# Patient Record
Sex: MALE | Race: BLACK OR AFRICAN AMERICAN | NOT HISPANIC OR LATINO | Employment: FULL TIME | URBAN - METROPOLITAN AREA
[De-identification: names, ages, dates, MRNs, and addresses within clinical notes are randomized per-mention and may not be internally consistent; named-entity substitution may affect disease eponyms.]

---

## 2020-03-02 ENCOUNTER — OFFICE VISIT (OUTPATIENT)
Dept: URGENT CARE | Facility: CLINIC | Age: 20
End: 2020-03-02
Payer: COMMERCIAL

## 2020-03-02 VITALS
TEMPERATURE: 99 F | HEART RATE: 88 BPM | WEIGHT: 173 LBS | DIASTOLIC BLOOD PRESSURE: 77 MMHG | SYSTOLIC BLOOD PRESSURE: 135 MMHG | HEIGHT: 66 IN | RESPIRATION RATE: 20 BRPM | BODY MASS INDEX: 27.8 KG/M2

## 2020-03-02 DIAGNOSIS — R68.89 FLU-LIKE SYMPTOMS: Primary | ICD-10-CM

## 2020-03-02 PROCEDURE — 87631 RESP VIRUS 3-5 TARGETS: CPT | Performed by: PHYSICIAN ASSISTANT

## 2020-03-02 PROCEDURE — G0382 LEV 3 HOSP TYPE B ED VISIT: HCPCS | Performed by: PHYSICIAN ASSISTANT

## 2020-03-02 RX ORDER — OSELTAMIVIR PHOSPHATE 75 MG/1
75 CAPSULE ORAL 2 TIMES DAILY
Qty: 10 CAPSULE | Refills: 0 | Status: SHIPPED | OUTPATIENT
Start: 2020-03-02 | End: 2020-03-07

## 2020-03-02 NOTE — PROGRESS NOTES
[facility]        NAME: Lynnette Ta is a 23 y o  male  : 2000    MRN: [id number]  DATE: 2020  TIME: 1:37 PM    Assessment and Plan   Flu-like symptoms [R68 89]  1  Flu-like symptoms  Influenza A/B and RSV by PCR    oseltamivir (TAMIFLU) 75 mg capsule         Patient Instructions     Discussed condition with pt  Presentation is concerning for Influenza  We discussed options and ultimately decided to send out flu swab and gave option to start pt on Tamiflu although treatment window not well defined  and rec hydration, rest, discussed OTC cough/cold meds, fever management, need to quarantine and wear mask if must go out  Follow up with PCP in 3-5 days  Proceed to  ER if symptoms worsen  Chief Complaint     Chief Complaint   Patient presents with    Flu Symptoms     started two days ago, sore throat, h/a, congestion, cough, temp 101, body aches         History of Present Illness       Pt presents with onset two days ago of fever, chills, myalgias, nasal congestion, dry cough, ST  Denies ear pain, N/V/D  Has been taking OTC meds for symptoms  Denies asthma/allergies  He is an active smoker  Review of Systems   Review of Systems   Constitutional: Positive for chills and fever  HENT: Positive for congestion and sore throat  Negative for ear pain  Respiratory: Positive for cough  Cardiovascular: Negative  Gastrointestinal: Negative  Genitourinary: Negative  Musculoskeletal: Positive for myalgias           Current Medications       Current Outpatient Medications:     oseltamivir (TAMIFLU) 75 mg capsule, Take 1 capsule (75 mg total) by mouth 2 (two) times a day for 5 days, Disp: 10 capsule, Rfl: 0    Current Allergies     Allergies as of 2020    (No Known Allergies)            The following portions of the patient's history were reviewed and updated as appropriate: allergies, current medications, past family history, past medical history, past social history, past surgical history and problem list      Past Medical History:   Diagnosis Date    No known health problems        Past Surgical History:   Procedure Laterality Date    NO PAST SURGERIES         History reviewed  No pertinent family history  Medications have been verified  Objective   /77 (Patient Position: Sitting)   Pulse 88   Temp 99 °F (37 2 °C) (Temporal)   Resp 20   Ht 5' 6" (1 676 m)   Wt 78 5 kg (173 lb)   BMI 27 92 kg/m²        Physical Exam     Physical Exam   Constitutional: He is oriented to person, place, and time  He appears well-developed and well-nourished  No distress  HENT:   Right Ear: Hearing, tympanic membrane, external ear and ear canal normal    Left Ear: Hearing, tympanic membrane, external ear and ear canal normal    Nose: Mucosal edema (B/L boggy turbinates) present  Mouth/Throat: Mucous membranes are normal  Posterior oropharyngeal erythema (PND) present  No oropharyngeal exudate  Neck: Neck supple  Cardiovascular: Normal rate, regular rhythm and normal heart sounds  Pulmonary/Chest: Effort normal and breath sounds normal    Lymphadenopathy:     He has no cervical adenopathy  Neurological: He is alert and oriented to person, place, and time  Psychiatric: He has a normal mood and affect  Vitals reviewed

## 2020-03-02 NOTE — PATIENT INSTRUCTIONS
Influenza   WHAT YOU NEED TO KNOW:   Influenza (the flu) is an infection caused by the influenza virus  The flu is easily spread when an infected person coughs, sneezes, or has close contact with others  You may be able to spread the flu to others for 1 week or longer after signs or symptoms appear  DISCHARGE INSTRUCTIONS:   Call 911 for any of the following:   · You have trouble breathing, and your lips look purple or blue  · You have a seizure  Return to the emergency department if:   · You are dizzy, or you are urinating less or not at all  · You have a headache with a stiff neck, and you feel tired or confused  · You have new pain or pressure in your chest     · Your symptoms, such as shortness of breath, vomiting, or diarrhea, get worse  · Your symptoms, such as fever and coughing, seem to get better, but then get worse  Contact your healthcare provider if:   · You have new muscle pain or weakness  · You have questions or concerns about your condition or care  Medicines: You may need any of the following:  · Acetaminophen  decreases pain and fever  It is available without a doctor's order  Ask how much to take and how often to take it  Follow directions  Acetaminophen can cause liver damage if not taken correctly  · NSAIDs , such as ibuprofen, help decrease swelling, pain, and fever  This medicine is available with or without a doctor's order  NSAIDs can cause stomach bleeding or kidney problems in certain people  If you take blood thinner medicine, always ask your healthcare provider if NSAIDs are safe for you  Always read the medicine label and follow directions  · Antivirals  help fight a viral infection  · Take your medicine as directed  Contact your healthcare provider if you think your medicine is not helping or if you have side effects  Tell him or her if you are allergic to any medicine  Keep a list of the medicines, vitamins, and herbs you take   Include the amounts, and when and why you take them  Bring the list or the pill bottles to follow-up visits  Carry your medicine list with you in case of an emergency  Rest  as much as you can to help you recover  Drink liquids as directed  to help prevent dehydration  Ask how much liquid to drink each day and which liquids are best for you  Prevent the spread of influenza:   · Wash your hands often  Use soap and water  Wash your hands after you use the bathroom, change a child's diapers, or sneeze  Wash your hands before you prepare or eat food  Use gel hand cleanser when soap and water are not available  Do not touch your eyes, nose, or mouth unless you have washed your hands first            · Cover your mouth when you sneeze or cough  Cough into a tissue or the bend of your arm  · Clean shared items with a germ-killing   Clean table surfaces, doorknobs, and light switches  Do not share towels, silverware, and dishes with people who are sick  Wash bed sheets, towels, silverware, and dishes with soap and water  · Wear a mask  over your mouth and nose if you are sick or are near anyone who is sick  · Stay away from others  if you are sick  · Influenza vaccine  helps prevent influenza (flu)  Everyone older than 6 months should get a yearly influenza vaccine  Get the vaccine as soon as it is available, usually in September or October each year  Follow up with your healthcare provider as directed:  Write down your questions so you remember to ask them during your visits  © 2017 [address] Information is for End User's use only and may not be sold, redistributed or otherwise used for commercial purposes  All illustrations and images included in CareNotes® are the copyrighted property of [employer]  or Garrick Moreau  The above information is an  only  It is not intended as medical advice for individual conditions or treatments   Talk to your doctor, nurse or pharmacist before following any medical regimen to see if it is safe and effective for you

## 2020-03-03 LAB
FLUAV RNA NPH QL NAA+PROBE: ABNORMAL
FLUBV RNA NPH QL NAA+PROBE: DETECTED
RSV RNA NPH QL NAA+PROBE: ABNORMAL

## 2020-05-30 ENCOUNTER — APPOINTMENT (OUTPATIENT)
Dept: URGENT CARE | Age: 20
End: 2020-05-30

## 2020-06-05 ENCOUNTER — APPOINTMENT (OUTPATIENT)
Dept: URGENT CARE | Age: 20
End: 2020-06-05
Payer: OTHER MISCELLANEOUS

## 2020-06-05 PROCEDURE — 99213 OFFICE O/P EST LOW 20 MIN: CPT | Performed by: PHYSICIAN ASSISTANT

## 2020-06-13 ENCOUNTER — APPOINTMENT (EMERGENCY)
Dept: RADIOLOGY | Facility: HOSPITAL | Age: 20
End: 2020-06-13
Payer: OTHER MISCELLANEOUS

## 2020-06-13 ENCOUNTER — APPOINTMENT (OUTPATIENT)
Dept: URGENT CARE | Age: 20
End: 2020-06-13
Payer: OTHER MISCELLANEOUS

## 2020-06-13 ENCOUNTER — HOSPITAL ENCOUNTER (EMERGENCY)
Facility: HOSPITAL | Age: 20
Discharge: HOME | End: 2020-06-13
Attending: EMERGENCY MEDICINE | Admitting: EMERGENCY MEDICINE
Payer: OTHER MISCELLANEOUS

## 2020-06-13 VITALS
HEART RATE: 104 BPM | WEIGHT: 174.16 LBS | TEMPERATURE: 98.6 F | DIASTOLIC BLOOD PRESSURE: 67 MMHG | OXYGEN SATURATION: 97 % | RESPIRATION RATE: 16 BRPM | BODY MASS INDEX: 28.11 KG/M2 | SYSTOLIC BLOOD PRESSURE: 137 MMHG

## 2020-06-13 DIAGNOSIS — S67.10XA CRUSHING INJURY OF FINGER OF RIGHT HAND: Primary | ICD-10-CM

## 2020-06-13 PROCEDURE — 99213 OFFICE O/P EST LOW 20 MIN: CPT | Performed by: PREVENTIVE MEDICINE

## 2020-06-13 PROCEDURE — 73140 X-RAY EXAM OF FINGER(S): CPT

## 2020-06-13 PROCEDURE — 99282 EMERGENCY DEPT VISIT SF MDM: CPT | Performed by: EMERGENCY MEDICINE

## 2020-06-13 PROCEDURE — 99283 EMERGENCY DEPT VISIT LOW MDM: CPT

## 2020-06-13 RX ORDER — IBUPROFEN 400 MG/1
400 TABLET ORAL EVERY 6 HOURS PRN
Qty: 30 TABLET | Refills: 0 | Status: SHIPPED | OUTPATIENT
Start: 2020-06-13 | End: 2021-08-10

## 2020-06-13 RX ORDER — ACETAMINOPHEN 500 MG
1000 TABLET ORAL EVERY 6 HOURS PRN
Qty: 30 TABLET | Refills: 0 | Status: SHIPPED | OUTPATIENT
Start: 2020-06-13 | End: 2021-08-10

## 2020-06-13 RX ORDER — ACETAMINOPHEN 325 MG/1
975 TABLET ORAL ONCE
Status: COMPLETED | OUTPATIENT
Start: 2020-06-13 | End: 2020-06-13

## 2020-06-13 RX ADMIN — ACETAMINOPHEN 975 MG: 325 TABLET ORAL at 02:46

## 2020-06-16 ENCOUNTER — APPOINTMENT (OUTPATIENT)
Dept: URGENT CARE | Age: 20
End: 2020-06-16
Payer: OTHER MISCELLANEOUS

## 2020-06-16 PROCEDURE — 99213 OFFICE O/P EST LOW 20 MIN: CPT | Performed by: NURSE PRACTITIONER

## 2020-07-04 ENCOUNTER — NURSE TRIAGE (OUTPATIENT)
Dept: OTHER | Facility: OTHER | Age: 20
End: 2020-07-04

## 2020-07-04 DIAGNOSIS — Z20.828 SARS-ASSOCIATED CORONAVIRUS EXPOSURE: Primary | ICD-10-CM

## 2020-07-04 NOTE — TELEPHONE ENCOUNTER
Regarding: Coronavirus - testing request   ----- Message from Claudeen Danes sent at 7/4/2020  8:47 AM EDT -----  Patient would like to be COVID tested   Please call patient back

## 2020-07-04 NOTE — TELEPHONE ENCOUNTER
Reason for Disposition   [1] COVID-19 infection suspected by caller or triager AND [2] mild symptoms (cough, fever, or others) AND [3] no complications or SOB    Answer Assessment - Initial Assessment Questions  1  COVID-19 DIAGNOSIS: "Who made your Coronavirus (COVID-19) diagnosis?" "Was it confirmed by a positive lab test?" If not diagnosed by a HCP, ask "Are there lots of cases (community spread) where you live?" (See public health department website, if unsure)      Community  2  ONSET: "When did the COVID-19 symptoms start?"       Thursday  3  WORST SYMPTOM: "What is your worst symptom?" (e g , cough, fever, shortness of breath, muscle aches)      Denies symptoms   4  COUGH: "Do you have a cough?" If so, ask: "How bad is the cough?"        Denies  5  FEVER: "Do you have a fever?" If so, ask: "What is your temperature, how was it measured, and when did it start?"      98 5 (oral) today, 101 2 (temporal) at work on Thursday  6  RESPIRATORY STATUS: "Describe your breathing?" (e g , shortness of breath, wheezing, unable to speak)       Unremarkable  7  BETTER-SAME-WORSE: "Are you getting better, staying the same or getting worse compared to yesterday?"  If getting worse, ask, "In what way?"      Better  8  HIGH RISK DISEASE: "Do you have any chronic medical problems?" (e g , asthma, heart or lung disease, weak immune system, etc )     Poss celiac disease  9  PREGNANCY: "Is there any chance you are pregnant?" "When was your last menstrual period?"      N/A  10   OTHER SYMPTOMS: "Do you have any other symptoms?"  (e g , chills, fatigue, headache, loss of smell or taste, muscle pain, sore throat)        Poss loss of smell    Protocols used: CORONAVIRUS (COVID-19) DIAGNOSED OR SUSPECTED-ADULT-AH

## 2020-07-05 ENCOUNTER — APPOINTMENT (OUTPATIENT)
Dept: URGENT CARE | Age: 20
End: 2020-07-05
Payer: COMMERCIAL

## 2020-07-05 DIAGNOSIS — Z20.828 SARS-ASSOCIATED CORONAVIRUS EXPOSURE: ICD-10-CM

## 2020-07-05 PROCEDURE — U0003 INFECTIOUS AGENT DETECTION BY NUCLEIC ACID (DNA OR RNA); SEVERE ACUTE RESPIRATORY SYNDROME CORONAVIRUS 2 (SARS-COV-2) (CORONAVIRUS DISEASE [COVID-19]), AMPLIFIED PROBE TECHNIQUE, MAKING USE OF HIGH THROUGHPUT TECHNOLOGIES AS DESCRIBED BY CMS-2020-01-R: HCPCS

## 2020-07-14 LAB — SARS-COV-2 RNA SPEC QL NAA+PROBE: NOT DETECTED

## 2020-07-15 ENCOUNTER — TELEPHONE (OUTPATIENT)
Dept: OTHER | Facility: OTHER | Age: 20
End: 2020-07-15

## 2020-07-15 NOTE — TELEPHONE ENCOUNTER
Your test for COVID-19, also known as novel coronavirus, came back negative  You do not have COVID-19  If you have any additional questions, we can schedule a virtual visit for you with a provider or call the [facility] [phone] Option 7 for care advice  For additional information , please visit the Coronavirus FAQ on the [address] ([employer])  Advised that he can print out his test results from My Chart  He said he is having difficulty getting it ti print  He was provided the number for My Chart support

## 2020-08-22 ENCOUNTER — OFFICE VISIT (OUTPATIENT)
Dept: URGENT CARE | Age: 20
End: 2020-08-22
Payer: COMMERCIAL

## 2020-08-22 ENCOUNTER — APPOINTMENT (OUTPATIENT)
Dept: RADIOLOGY | Age: 20
End: 2020-08-22
Payer: COMMERCIAL

## 2020-08-22 VITALS
HEART RATE: 90 BPM | BODY MASS INDEX: 28.12 KG/M2 | RESPIRATION RATE: 18 BRPM | OXYGEN SATURATION: 99 % | WEIGHT: 175 LBS | TEMPERATURE: 96.1 F | HEIGHT: 66 IN

## 2020-08-22 DIAGNOSIS — R06.02 SHORTNESS OF BREATH: Primary | ICD-10-CM

## 2020-08-22 DIAGNOSIS — R06.02 SHORTNESS OF BREATH: ICD-10-CM

## 2020-08-22 PROCEDURE — U0003 INFECTIOUS AGENT DETECTION BY NUCLEIC ACID (DNA OR RNA); SEVERE ACUTE RESPIRATORY SYNDROME CORONAVIRUS 2 (SARS-COV-2) (CORONAVIRUS DISEASE [COVID-19]), AMPLIFIED PROBE TECHNIQUE, MAKING USE OF HIGH THROUGHPUT TECHNOLOGIES AS DESCRIBED BY CMS-2020-01-R: HCPCS | Performed by: PREVENTIVE MEDICINE

## 2020-08-22 PROCEDURE — 71046 X-RAY EXAM CHEST 2 VIEWS: CPT

## 2020-08-22 PROCEDURE — G0382 LEV 3 HOSP TYPE B ED VISIT: HCPCS | Performed by: PREVENTIVE MEDICINE

## 2020-08-22 RX ORDER — AZITHROMYCIN 250 MG/1
TABLET, FILM COATED ORAL
Qty: 6 TABLET | Refills: 0 | Status: SHIPPED | OUTPATIENT
Start: 2020-08-22 | End: 2020-08-26

## 2020-08-22 RX ORDER — ALBUTEROL SULFATE 90 UG/1
2 AEROSOL, METERED RESPIRATORY (INHALATION) EVERY 6 HOURS PRN
Qty: 18 G | Refills: 0 | Status: SHIPPED | OUTPATIENT
Start: 2020-08-22 | End: 2021-08-10

## 2020-08-22 RX ORDER — METHYLPREDNISOLONE 4 MG/1
TABLET ORAL
Qty: 21 EACH | Refills: 0 | Status: SHIPPED | OUTPATIENT
Start: 2020-08-22 | End: 2021-08-10

## 2020-08-22 NOTE — LETTER
August 22, 2020     Patient: Natalia Alexander   YOB: 2000   Date of Visit: 8/22/2020       To Whom It May Concern: It is my medical opinion that [employer] should remain out of work until lab test result returns in 3-5 days       If you have any questions or concerns, please don't hesitate to call           Sincerely,        Alex Pillai MD    CC: No Recipients

## 2020-08-22 NOTE — PATIENT INSTRUCTIONS
Novel Coronavirus   AMBULATORY CARE:   The novel coronavirus (nCoV)  is a new strain (type) of coronavirus  Coronaviruses often cause mild respiratory (lung) infections, such as the common cold  They can also cause more severe infections  Examples include acute respiratory syndrome (SARS), Middle East respiratory syndrome (MERS), pneumonia, and bronchitis  The nCoV can cause more severe symptoms than earlier coronaviruses  The nCoV was first found in individuals in part of [place] at the end of 2019  The virus is spreading as people who are infected travel to other parts of the world  Signs and symptoms of nCoV  can take 2 to 14 days to develop and range from mild to severe:  · Fever    · Cough    · Shortness of breath or trouble breathing    · Pneumonia (in severe cases)    Call your local emergency number (911 in the [address] [place] [address],[address]) for any of the following:  Tell the  you may have nCoV  This will help anyone in the ambulance stay safe, and to call ahead to the hospital   · You have sudden shortness of breath  · You have a fast heartbeat or chest pain  Seek care immediately if:   · You feel so dizzy that you have trouble standing up  · Your lips and fingernails turn blue  Call your doctor if:   · You have a fever over 102ºF (39ºC)  · Your sore throat gets worse or you see white or yellow spots in your throat  · Your symptoms get worse after 3 to 5 days or your cold is not better in 14 days  · You have a rash anywhere on your skin  · You have large, tender lumps in your neck  · You have thick, green, or yellow drainage from your nose  · You cough up thick yellow, green, or bloody mucus  · You are vomiting for more than 24 hours and cannot keep fluids down  · You have a bad earache  · You have questions or concerns about your condition or care  How nCoV spreads: It is not known for sure how the virus spreads   The virus may spread in droplets when an infected person coughs or sneezes  Others become infected by breathing in the virus or getting the virus in their eyes  The virus can also possibly spread if a person touches certain animals, such as in a live market  If you develop symptoms of nCoV,  you may need to be checked  Call your doctor if you traveled within the past 14 days to an area where nCoV is active  Call your doctor if you have close contact with someone else who did  Your doctor will tell you what to do  He or she will need to take precautions in the office to protect staff members and other patients  Your doctor will take samples from your airway  The samples have to be sent to the Centers for Disease Control and Prevention (CDC) to be tested  What to do if you have nCoV:  No treatment is available for nCoV  Medicine may be given to help relieve your cough or fever, or to help you breathe more easily  Severe nCoV may need to be treated in the hospital  In the hospital, you may need breathing treatment or support, such as extra oxygen  The following can help you prevent spreading nCoV to others  Have anyone you are caring for who has nCoV also use the guidelines  · Limit close contact with others  Stay in a different room, or sleep in a separate bed  Remind others to stay at least 6 feet (2 meters) away from you while you are sick  Only go out of your house if you are going to a medical appointment  While you are recovering, always call the office of any healthcare provider you seeing  The provider will need to prepare for your arrival to keep others safe  · Wear a medical mask when you are around others  A medical mask will help prevent you from spreading the virus  You can ask others around you to wear a medical mask if you are not able to wear one  · Cover your mouth and nose to sneeze or cough  Sneeze and cough into a tissue that covers your mouth and nose  Use the bend of our arm if you do not have a tissue  Throw the tissue away in a trash can right away  Then wash your hands well with soap and water  Use hand  that contains alcohol if you cannot wash your hands  · Wash your hands often  You and everyone in your home must wash your hands throughout the day  Use soap and water  Use germ-killing gel if soap and water are not available  Do not touch your eyes or mouth if you have not washed your hands  · Do not share items with other people  Do not share dishes, towels, or other items with anyone  Always wash items after you use them  · Clean frequently touched surfaces often  Use household  or bleach diluted with water to clean counters, doorknobs, toilet seats, and other surfaces  · Do not handle live animals  You may be able to spread nCoV to animals, including pets  Until more is known, it is best not to touch, play with, or handle live animals while you are sick  Help relieve mild symptoms:   · Drink more liquids as directed  Liquids will help thin and loosen mucus so you can cough it up  Liquids will also help prevent dehydration  Liquids that help prevent dehydration include water, fruit juice, and broth  Do not drink liquids that contain caffeine  Caffeine can increase your risk for dehydration  Ask your healthcare provider how much liquid to drink each day  · Soothe a sore throat  Gargle with warm salt water  This helps your sore throat feel better  Make salt water by dissolving ¼ teaspoon salt in 1 cup warm water  You may also suck on hard candy or throat lozenges  You may use a sore throat spray  · Use a humidifier or vaporizer  Use a cool mist humidifier or a vaporizer to increase air moisture in your home  This may make it easier for you to breathe and help decrease your cough  · Use saline nasal drops as directed  These help relieve congestion  · Apply petroleum-based jelly around the outside of your nostrils  This can decrease irritation from blowing your nose  · Do not smoke    Nicotine and other chemicals in cigarettes and cigars can make your symptoms worse  They can also cause infections such as bronchitis or pneumonia  Ask your healthcare provider for information if you currently smoke and need help to quit  E-cigarettes or smokeless tobacco still contain nicotine  Talk to your healthcare provider before you use these products  Follow up with your doctor as directed:  Write down your questions so you remember to ask them during your visits  © Copyright [address] Information is for End User's use only and may not be sold, redistributed or otherwise used for commercial purposes  All illustrations and images included in CareNotes® are the copyrighted property of A D A M , [employer]  or [address] [place] [address]  The above information is an  only  It is not intended as medical advice for individual conditions or treatments  Talk to your doctor, nurse or pharmacist before following any medical regimen to see if it is safe and effective for you

## 2020-08-23 LAB — SARS-COV-2 RNA SPEC QL NAA+PROBE: NOT DETECTED

## 2020-08-24 ENCOUNTER — TELEPHONE (OUTPATIENT)
Dept: URGENT CARE | Age: 20
End: 2020-08-24

## 2020-08-24 NOTE — PROGRESS NOTES
[facility]        NAME: Trudi Velazquez is a 21 y o  male  : 2000    MRN: [id number]  DATE: 2020  TIME: 5:24 PM    Assessment and Plan   Shortness of breath [R06 02]  1  Shortness of breath  Novel Coronavirus (COVID-19), PCR [facility] - Office Collection    XR chest pa & lateral    azithromycin (ZITHROMAX) 250 mg tablet    methylPREDNISolone 4 MG tablet therapy pack    albuterol (Ventolin HFA) 90 mcg/act inhaler    CANCELED: Novel Coronavirus (COVID-19), PCR [facility] - Office Collection         Patient Instructions       Follow up with PCP in 3-5 days  Proceed to  ER if symptoms worsen  Chief Complaint     Chief Complaint   Patient presents with    Shortness of Breath     body aches,fatigue, cough x 2 days          History of Present Illness       Shortness of Breath   This is a new problem  The current episode started in the past 7 days  The problem occurs constantly  The problem has been unchanged  Associated symptoms include a fever and headaches  Nothing aggravates the symptoms  Associated symptoms comments: Shortness of breath, fatigue  The patient has no known risk factors for DVT/PE  He has tried nothing for the symptoms  The treatment provided no relief  Review of Systems   Review of Systems   Constitutional: Positive for fever  Eyes: Negative  Respiratory: Positive for shortness of breath  Cardiovascular: Negative  Neurological: Positive for headaches  All other systems reviewed and are negative          Current Medications       Current Outpatient Medications:     acetaminophen (TYLENOL) 500 mg tablet, Take 2 tablets (1,000 mg total) by mouth every 6 (six) hours as needed for mild pain (Patient not taking: Reported on 2020), Disp: 30 tablet, Rfl: 0    albuterol (Ventolin HFA) 90 mcg/act inhaler, Inhale 2 puffs every 6 (six) hours as needed for wheezing, Disp: 18 g, Rfl: 0    azithromycin (ZITHROMAX) 250 mg tablet, Take 2 tablets today then 1 tablet daily x 4 days, Disp: 6 tablet, Rfl: 0    ibuprofen (MOTRIN) 400 mg tablet, Take 1 tablet (400 mg total) by mouth every 6 (six) hours as needed for mild pain (Patient not taking: Reported on 8/22/2020), Disp: 30 tablet, Rfl: 0    methylPREDNISolone 4 MG tablet therapy pack, Use as directed on package, Disp: 21 each, Rfl: 0    Current Allergies     Allergies as of 08/22/2020    (No Known Allergies)            The following portions of the patient's history were reviewed and updated as appropriate: allergies, current medications, past family history, past medical history, past social history, past surgical history and problem list      Past Medical History:   Diagnosis Date    No known health problems        Past Surgical History:   Procedure Laterality Date    NO PAST SURGERIES         History reviewed  No pertinent family history  Medications have been verified  Objective   Pulse 90   Temp (!) 96 1 °F (35 6 °C)   Resp 18   Ht 5' 6" (1 676 m)   Wt 79 4 kg (175 lb)   SpO2 99%   BMI 28 25 kg/m²        Physical Exam     Physical Exam  Vitals signs and nursing note reviewed  Constitutional:       Appearance: He is well-developed  HENT:      Head: Normocephalic  Right Ear: External ear normal       Left Ear: External ear normal       Nose: Mucosal edema and rhinorrhea present  Mouth/Throat:      Pharynx: Posterior oropharyngeal erythema present  No oropharyngeal exudate  Neck:      Musculoskeletal: Normal range of motion  Cardiovascular:      Rate and Rhythm: Normal rate and regular rhythm  Heart sounds: Normal heart sounds  Pulmonary:      Effort: Pulmonary effort is normal       Breath sounds: No wheezing  Lymphadenopathy:      Cervical: No cervical adenopathy  Skin:     General: Skin is warm  Coloration: Skin is not pale  Findings: No rash

## 2020-09-18 ENCOUNTER — NURSE TRIAGE (OUTPATIENT)
Dept: OTHER | Facility: OTHER | Age: 20
End: 2020-09-18

## 2020-09-18 DIAGNOSIS — Z20.822 COVID-19 RULED OUT: Primary | ICD-10-CM

## 2020-09-18 NOTE — TELEPHONE ENCOUNTER
Regarding: Covid test  ----- Message from Anam Woodard sent at 9/18/2020  7:47 PM EDT -----  "My job is requiring a covid test since I had contact with someone who tested positive "

## 2020-09-18 NOTE — TELEPHONE ENCOUNTER
Patient states "my work is requiring my to get tested again due to contact with a positive covid person"    Reason for Disposition   [1] COVID-19 EXPOSURE (Close Contact) within last 14 days AND [2] NO cough, fever, or breathing difficulty    Answer Assessment - Initial Assessment Questions  1  CLOSE CONTACT: "Who is the person with the confirmed or suspected COVID-19 infection that you were exposed to?"      My friend's family member has it and now my friend is self isolating  I may have had contact with the person at a party  2  PLACE of CONTACT: "Where were you when you were exposed to COVID-19?" (e g , home, school, medical waiting room; which city?)      Family party less then a week ago  3  TYPE of CONTACT: "How much contact was there?" (e g , sitting next to, live in same house, work in same office, same building)      Sitting next to the positive tested person and at the same party for a few hours  4  DURATION of CONTACT: "How long were you in contact with the COVID-19 patient?" (e g , a few seconds, passed by person, a few minutes, live with the patient)     About half a day  5  DATE of CONTACT: "When did you have contact with a COVID-19 patient?" (e g , how many days ago)      2 days ago  6  TRAVEL: "Have you traveled out of the country recently?" If so, "When and where?"      * Also ask about out-of-state travel, since the CDC has identified some high risk cities for community spread in the [address] [place] [address],[address]  * Note: Travel becomes less relevant if there is widespread community transmission where the patient lives  no  7  COMMUNITY SPREAD: "Are there lots of cases of COVID-19 (community spread) where you live?" (See public health department website, if unsure)    * MAJOR community spread: high number of cases; numbers of cases are increasing; many people hospitalized  * MINOR community spread: low number of cases; not increasing; few or no people hospitalized      Unsure,  8   SYMPTOMS: "Do you have any symptoms?" (e g , fever, cough, breathing difficulty)      Pt states "trouble breathing for the past few weeks and I got tested and it was negative" I was given an inhaler at that time  Difficulty breathing has not gotten worse    10  HIGH RISK: "Do you have any heart or lung problems?  Do you have a weak immune system?" (e g , CHF, COPD, asthma, HIV positive, chemotherapy, renal failure, diabetes mellitus, sickle cell anemia)       asthma    Protocols used: CORONAVIRUS (COVID-19) - EXPOSURE-ADULT-AH

## 2020-09-19 ENCOUNTER — APPOINTMENT (OUTPATIENT)
Dept: URGENT CARE | Age: 20
End: 2020-09-19

## 2020-09-19 DIAGNOSIS — Z20.822 COVID-19 RULED OUT: ICD-10-CM

## 2020-09-19 PROCEDURE — U0003 INFECTIOUS AGENT DETECTION BY NUCLEIC ACID (DNA OR RNA); SEVERE ACUTE RESPIRATORY SYNDROME CORONAVIRUS 2 (SARS-COV-2) (CORONAVIRUS DISEASE [COVID-19]), AMPLIFIED PROBE TECHNIQUE, MAKING USE OF HIGH THROUGHPUT TECHNOLOGIES AS DESCRIBED BY CMS-2020-01-R: HCPCS | Performed by: FAMILY MEDICINE

## 2020-09-20 LAB — SARS-COV-2 RNA SPEC QL NAA+PROBE: NOT DETECTED

## 2020-10-14 ENCOUNTER — APPOINTMENT (OUTPATIENT)
Dept: RADIOLOGY | Age: 20
End: 2020-10-14
Payer: COMMERCIAL

## 2020-10-14 ENCOUNTER — OFFICE VISIT (OUTPATIENT)
Dept: URGENT CARE | Age: 20
End: 2020-10-14
Payer: COMMERCIAL

## 2020-10-14 VITALS
SYSTOLIC BLOOD PRESSURE: 118 MMHG | TEMPERATURE: 97 F | RESPIRATION RATE: 18 BRPM | DIASTOLIC BLOOD PRESSURE: 80 MMHG | OXYGEN SATURATION: 99 % | HEART RATE: 92 BPM

## 2020-10-14 DIAGNOSIS — Z20.822 COVID-19 RULED OUT: Primary | ICD-10-CM

## 2020-10-14 DIAGNOSIS — R10.9 ABDOMINAL PAIN, UNSPECIFIED ABDOMINAL LOCATION: ICD-10-CM

## 2020-10-14 PROCEDURE — 74022 RADEX COMPL AQT ABD SERIES: CPT

## 2020-10-14 PROCEDURE — G0382 LEV 3 HOSP TYPE B ED VISIT: HCPCS | Performed by: NURSE PRACTITIONER

## 2020-10-14 PROCEDURE — U0003 INFECTIOUS AGENT DETECTION BY NUCLEIC ACID (DNA OR RNA); SEVERE ACUTE RESPIRATORY SYNDROME CORONAVIRUS 2 (SARS-COV-2) (CORONAVIRUS DISEASE [COVID-19]), AMPLIFIED PROBE TECHNIQUE, MAKING USE OF HIGH THROUGHPUT TECHNOLOGIES AS DESCRIBED BY CMS-2020-01-R: HCPCS | Performed by: NURSE PRACTITIONER

## 2020-10-15 LAB — SARS-COV-2 RNA SPEC QL NAA+PROBE: NOT DETECTED

## 2021-01-04 ENCOUNTER — NURSE TRIAGE (OUTPATIENT)
Dept: OTHER | Facility: OTHER | Age: 21
End: 2021-01-04

## 2021-01-04 DIAGNOSIS — Z20.822 EXPOSURE TO COVID-19 VIRUS: Primary | ICD-10-CM

## 2021-01-04 NOTE — TELEPHONE ENCOUNTER
[address]- Headache and fever 101   2   ----- Message from Randy Thomason sent at 2021  3:19 PM EST -----  "Someone in my house hold tested positive   Headache, fever 101 2 , body ache and soreness

## 2021-01-04 NOTE — TELEPHONE ENCOUNTER
Reason for Disposition   [1] COVID-19 infection suspected by caller or triager AND [2] mild symptoms (cough, fever, or others) AND [2] no complications or SOB    Answer Assessment - Initial Assessment Questions  1  COVID-19 DIAGNOSIS: "Who made your Coronavirus (COVID-19) diagnosis?" "Was it confirmed by a positive lab test?" If not diagnosed by a HCP, ask "Are there lots of cases (community spread) where you live?" (See public health department website, if unsure)      Family member tested positive  2  COVID-19 EXPOSURE: "Was there any known exposure to COVID before the symptoms began?" CDC Definition of close contact: within 6 feet (2 meters) for a total of 15 minutes or more over a 24-hour period  Live in same home  3  ONSET: "When did the COVID-19 symptoms start?"       12/30  4  WORST SYMPTOM: "What is your worst symptom?" (e g , cough, fever, shortness of breath, muscle aches)   body aches  5  COUGH: "Do you have a cough?" If so, ask: "How bad is the cough?"        Wet cough   6  FEVER: "Do you have a fever?" If so, ask: "What is your temperature, how was it measured, and when did it start?"     101 2 - 99  7  RESPIRATORY STATUS: "Describe your breathing?" (e g , shortness of breath, wheezing, unable to speak)     No but when gets coughing attack can get short on air  8  BETTER-SAME-WORSE: "Are you getting better, staying the same or getting worse compared to yesterday?"  If getting worse, ask, "In what way?"      worse  9  HIGH RISK DISEASE: "Do you have any chronic medical problems?" (e g , asthma, heart or lung disease, weak immune system, etc )   No  10  PREGNANCY: "Is there any chance you are pregnant?" "When was your last menstrual period?"       N/a  11   OTHER SYMPTOMS: "Do you have any other symptoms?"  (e g , chills, fatigue, headache, loss of smell or taste, muscle pain, sore throat)  Chills, fatigue, headache, muscle pains, sore throat, stomach pain    Protocols used: CORONAVIRUS (COVID-19) DIAGNOSED OR SUSPECTED-ADULT-OH

## 2021-01-05 DIAGNOSIS — Z20.822 EXPOSURE TO COVID-19 VIRUS: ICD-10-CM

## 2021-01-05 PROCEDURE — U0003 INFECTIOUS AGENT DETECTION BY NUCLEIC ACID (DNA OR RNA); SEVERE ACUTE RESPIRATORY SYNDROME CORONAVIRUS 2 (SARS-COV-2) (CORONAVIRUS DISEASE [COVID-19]), AMPLIFIED PROBE TECHNIQUE, MAKING USE OF HIGH THROUGHPUT TECHNOLOGIES AS DESCRIBED BY CMS-2020-01-R: HCPCS | Performed by: FAMILY MEDICINE

## 2021-01-06 LAB — SARS-COV-2 RNA SPEC QL NAA+PROBE: DETECTED

## 2021-01-06 NOTE — RESULT ENCOUNTER NOTE
Please call the patient regarding his abnormal result  1st attempt  Ritika Juárez The patient was called for notification of a test result for COVID-19  The patient did not answer the phone and a voicemail was left requesting a call back to [phone], Option 7

## 2021-05-24 ENCOUNTER — APPOINTMENT (EMERGENCY)
Dept: RADIOLOGY | Facility: HOSPITAL | Age: 21
End: 2021-05-24
Payer: COMMERCIAL

## 2021-05-24 ENCOUNTER — APPOINTMENT (EMERGENCY)
Dept: CT IMAGING | Facility: HOSPITAL | Age: 21
End: 2021-05-24
Payer: COMMERCIAL

## 2021-05-24 ENCOUNTER — HOSPITAL ENCOUNTER (EMERGENCY)
Facility: HOSPITAL | Age: 21
Discharge: HOME | End: 2021-05-25
Attending: EMERGENCY MEDICINE | Admitting: EMERGENCY MEDICINE
Payer: COMMERCIAL

## 2021-05-24 DIAGNOSIS — R07.9 CHEST PAIN: Primary | ICD-10-CM

## 2021-05-24 LAB
ALBUMIN SERPL BCP-MCNC: 4.2 G/DL (ref 3.5–5)
ALP SERPL-CCNC: 91 U/L (ref 46–116)
ALT SERPL W P-5'-P-CCNC: 34 U/L (ref 12–78)
ANION GAP SERPL CALCULATED.3IONS-SCNC: 6 MMOL/L (ref 4–13)
AST SERPL W P-5'-P-CCNC: 23 U/L (ref 5–45)
BASOPHILS # BLD AUTO: 0.07 THOUSANDS/ΜL (ref 0–0.1)
BASOPHILS NFR BLD AUTO: 1 % (ref 0–1)
BILIRUB SERPL-MCNC: 0.54 MG/DL (ref 0.2–1)
BUN SERPL-MCNC: 10 MG/DL (ref 5–25)
CALCIUM SERPL-MCNC: 9.3 MG/DL (ref 8.3–10.1)
CHLORIDE SERPL-SCNC: 106 MMOL/L (ref 100–108)
CO2 SERPL-SCNC: 29 MMOL/L (ref 21–32)
CREAT SERPL-MCNC: 1.11 MG/DL (ref 0.6–1.3)
EOSINOPHIL # BLD AUTO: 0.06 THOUSAND/ΜL (ref 0–0.61)
EOSINOPHIL NFR BLD AUTO: 1 % (ref 0–6)
ERYTHROCYTE [DISTWIDTH] IN BLOOD BY AUTOMATED COUNT: 11.9 % (ref 11.6–15.1)
GFR SERPL CREATININE-BSD FRML MDRD: 110 ML/MIN/1.73SQ M
GLUCOSE SERPL-MCNC: 89 MG/DL (ref 65–140)
HCT VFR BLD AUTO: 42.7 % (ref 36.5–49.3)
HGB BLD-MCNC: 14.4 G/DL (ref 12–17)
IMM GRANULOCYTES # BLD AUTO: 0.02 THOUSAND/UL (ref 0–0.2)
IMM GRANULOCYTES NFR BLD AUTO: 0 % (ref 0–2)
LYMPHOCYTES # BLD AUTO: 2.67 THOUSANDS/ΜL (ref 0.6–4.47)
LYMPHOCYTES NFR BLD AUTO: 50 % (ref 14–44)
MCH RBC QN AUTO: 29.2 PG (ref 26.8–34.3)
MCHC RBC AUTO-ENTMCNC: 33.7 G/DL (ref 31.4–37.4)
MCV RBC AUTO: 87 FL (ref 82–98)
MONOCYTES # BLD AUTO: 0.37 THOUSAND/ΜL (ref 0.17–1.22)
MONOCYTES NFR BLD AUTO: 7 % (ref 4–12)
NEUTROPHILS # BLD AUTO: 2.23 THOUSANDS/ΜL (ref 1.85–7.62)
NEUTS SEG NFR BLD AUTO: 41 % (ref 43–75)
NRBC BLD AUTO-RTO: 0 /100 WBCS
PLATELET # BLD AUTO: 281 THOUSANDS/UL (ref 149–390)
PMV BLD AUTO: 9.9 FL (ref 8.9–12.7)
POTASSIUM SERPL-SCNC: 3.8 MMOL/L (ref 3.5–5.3)
PROT SERPL-MCNC: 8.2 G/DL (ref 6.4–8.2)
RBC # BLD AUTO: 4.93 MILLION/UL (ref 3.88–5.62)
SODIUM SERPL-SCNC: 141 MMOL/L (ref 136–145)
TROPONIN I SERPL-MCNC: <0.02 NG/ML
WBC # BLD AUTO: 5.42 THOUSAND/UL (ref 4.31–10.16)

## 2021-05-24 PROCEDURE — 36415 COLL VENOUS BLD VENIPUNCTURE: CPT

## 2021-05-24 PROCEDURE — 93005 ELECTROCARDIOGRAM TRACING: CPT

## 2021-05-24 PROCEDURE — 99285 EMERGENCY DEPT VISIT HI MDM: CPT

## 2021-05-24 PROCEDURE — 71275 CT ANGIOGRAPHY CHEST: CPT

## 2021-05-24 PROCEDURE — 85025 COMPLETE CBC W/AUTO DIFF WBC: CPT | Performed by: EMERGENCY MEDICINE

## 2021-05-24 PROCEDURE — 96374 THER/PROPH/DIAG INJ IV PUSH: CPT

## 2021-05-24 PROCEDURE — 99285 EMERGENCY DEPT VISIT HI MDM: CPT | Performed by: EMERGENCY MEDICINE

## 2021-05-24 PROCEDURE — 74174 CTA ABD&PLVS W/CONTRAST: CPT

## 2021-05-24 PROCEDURE — 84484 ASSAY OF TROPONIN QUANT: CPT | Performed by: EMERGENCY MEDICINE

## 2021-05-24 PROCEDURE — 80053 COMPREHEN METABOLIC PANEL: CPT | Performed by: EMERGENCY MEDICINE

## 2021-05-24 RX ORDER — SUCRALFATE 1 G/1
1 TABLET ORAL ONCE
Status: COMPLETED | OUTPATIENT
Start: 2021-05-24 | End: 2021-05-24

## 2021-05-24 RX ORDER — MAGNESIUM HYDROXIDE/ALUMINUM HYDROXICE/SIMETHICONE 120; 1200; 1200 MG/30ML; MG/30ML; MG/30ML
30 SUSPENSION ORAL ONCE
Status: COMPLETED | OUTPATIENT
Start: 2021-05-24 | End: 2021-05-24

## 2021-05-24 RX ORDER — MORPHINE SULFATE 4 MG/ML
4 INJECTION, SOLUTION INTRAMUSCULAR; INTRAVENOUS ONCE
Status: COMPLETED | OUTPATIENT
Start: 2021-05-24 | End: 2021-05-24

## 2021-05-24 RX ADMIN — MORPHINE SULFATE 4 MG: 4 INJECTION INTRAVENOUS at 22:30

## 2021-05-24 RX ADMIN — SUCRALFATE 1 G: 1 TABLET ORAL at 22:29

## 2021-05-24 RX ADMIN — ALUMINA, MAGNESIA, AND SIMETHICONE ORAL SUSPENSION REGULAR STRENGTH 30 ML: 1200; 1200; 120 SUSPENSION ORAL at 22:29

## 2021-05-24 RX ADMIN — IOHEXOL 100 ML: 350 INJECTION, SOLUTION INTRAVENOUS at 23:38

## 2021-05-24 NOTE — Clinical Note
accompanied Sallie Carballo to the emergency department on 5/24/2021  Return date if applicable: If you have any questions or concerns, please don't hesitate to call        Dior Jenkins MD

## 2021-05-24 NOTE — Clinical Note
Sallie Carballo was seen and treated in our emergency department on 5/24/2021  Diagnosis:     Loly Abdul  may return to work on return date  He may return on this date: 06/02/2021         If you have any questions or concerns, please don't hesitate to call        Candy Rocha RN    ______________________________           _______________          _______________  Hospital Representative                              Date                                Time

## 2021-05-25 VITALS
DIASTOLIC BLOOD PRESSURE: 74 MMHG | HEART RATE: 52 BPM | OXYGEN SATURATION: 99 % | RESPIRATION RATE: 16 BRPM | TEMPERATURE: 97.3 F | SYSTOLIC BLOOD PRESSURE: 124 MMHG

## 2021-05-25 LAB
ATRIAL RATE: 87 BPM
ATRIAL RATE: 89 BPM
P AXIS: 75 DEGREES
P AXIS: 79 DEGREES
PR INTERVAL: 158 MS
PR INTERVAL: 158 MS
QRS AXIS: 88 DEGREES
QRS AXIS: 88 DEGREES
QRSD INTERVAL: 92 MS
QRSD INTERVAL: 94 MS
QT INTERVAL: 360 MS
QT INTERVAL: 366 MS
QTC INTERVAL: 417 MS
QTC INTERVAL: 423 MS
T WAVE AXIS: 69 DEGREES
T WAVE AXIS: 70 DEGREES
VENTRICULAR RATE: 78 BPM
VENTRICULAR RATE: 83 BPM

## 2021-05-25 PROCEDURE — 93010 ELECTROCARDIOGRAM REPORT: CPT | Performed by: INTERNAL MEDICINE

## 2021-05-25 NOTE — ED PROVIDER NOTES
History  Chief Complaint   Patient presents with    Chest Pain     having pain radiating from center of spine to sternum since Saturday      HPI     Patient is a 21 yom who presents with chest pain  Pain is achy, pain does radiate to the back  No vomiting      + nausea  Denies any significant PMH  Notes similar pain on and off for the past several months  No focal neurological symptoms  Or no ripping or tearing chest pain  He notes that the chest pain is in the lower area of the chest/epigastrium  No dysuria, hematuria  No pulsatile abdominal mass  Medical decision making, patient feeling well at time of discharge, pain improved, well-appearing, I did discuss the CT scan findings with him, he will follow up, patient to return to the emergency department if the symptoms worsen  Prior to Admission Medications   Prescriptions Last Dose Informant Patient Reported? Taking?   acetaminophen (TYLENOL) 500 mg tablet   No No   Sig: Take 2 tablets (1,000 mg total) by mouth every 6 (six) hours as needed for mild pain   Patient not taking: Reported on 8/22/2020   albuterol (Ventolin HFA) 90 mcg/act inhaler   No No   Sig: Inhale 2 puffs every 6 (six) hours as needed for wheezing   ibuprofen (MOTRIN) 400 mg tablet   No No   Sig: Take 1 tablet (400 mg total) by mouth every 6 (six) hours as needed for mild pain   Patient not taking: Reported on 8/22/2020   methylPREDNISolone 4 MG tablet therapy pack   No No   Sig: Use as directed on package      Facility-Administered Medications: None       Past Medical History:   Diagnosis Date    No known health problems        Past Surgical History:   Procedure Laterality Date    NO PAST SURGERIES         History reviewed  No pertinent family history  I have reviewed and agree with the history as documented      E-Cigarette/Vaping     E-Cigarette/Vaping Substances     Social History     Tobacco Use    Smoking status: Current Every Day Smoker     Types: E-Cigarettes    Smokeless tobacco: Never Used   Substance Use Topics    Alcohol use: Not Currently     Frequency: Never    Drug use: Never       Review of Systems   Respiratory: Positive for chest tightness  All other systems reviewed and are negative  Physical Exam  Physical Exam  Vitals signs and nursing note reviewed  Constitutional:       Appearance: He is well-developed  He is not diaphoretic  HENT:      Head: Normocephalic and atraumatic  Right Ear: External ear normal       Left Ear: External ear normal       Nose: No congestion  Eyes:      General:         Right eye: No discharge  Left eye: No discharge  Extraocular Movements: Extraocular movements intact  Neck:      Musculoskeletal: Normal range of motion and neck supple  Cardiovascular:      Rate and Rhythm: Normal rate and regular rhythm  Heart sounds: Normal heart sounds  No murmur  Pulmonary:      Effort: Pulmonary effort is normal  No respiratory distress  Breath sounds: Normal breath sounds  No wheezing  Abdominal:      General: There is no distension  Palpations: Abdomen is soft  Tenderness: There is no abdominal tenderness  Musculoskeletal:         General: No tenderness or signs of injury  Skin:     General: Skin is warm and dry  Findings: No erythema  Neurological:      General: No focal deficit present  Mental Status: He is alert and oriented to person, place, and time  Motor: No weakness     Psychiatric:         Mood and Affect: Mood normal          Behavior: Behavior normal          Vital Signs  ED Triage Vitals [05/24/21 2051]   Temperature Pulse Respirations Blood Pressure SpO2   (!) 97 3 °F (36 3 °C) 82 18 146/70 100 %      Temp Source Heart Rate Source Patient Position - Orthostatic VS BP Location FiO2 (%)   Oral Monitor Lying Right arm --      Pain Score       6           Vitals:    05/24/21 2300 05/25/21 0000 05/25/21 0100 05/25/21 0200   BP: 144/75 135/74 134/63 124/74   Pulse: (!) 54 (!) 50 (!) 48 (!) 52   Patient Position - Orthostatic VS:  Lying Lying Lying         Visual Acuity      ED Medications  Medications   sucralfate (CARAFATE) tablet 1 g (1 g Oral Given 5/24/21 2229)   aluminum-magnesium hydroxide-simethicone (MYLANTA) oral suspension 30 mL (30 mL Oral Given 5/24/21 2229)   morphine (PF) 4 mg/mL injection 4 mg (4 mg Intravenous Given 5/24/21 2230)   iohexol (OMNIPAQUE) 350 MG/ML injection (MULTI-DOSE) 100 mL (100 mL Intravenous Given 5/24/21 2338)       Diagnostic Studies  Results Reviewed     Procedure Component Value Units Date/Time    Troponin I [[id number]]  (Normal) Collected: 05/24/21 2057    Lab Status: Final result Specimen: Blood from Arm, Right Updated: 05/24/21 2144     Troponin I <0 02 ng/mL     Comprehensive metabolic panel [[id number]] Collected: 05/24/21 2057    Lab Status: Final result Specimen: Blood from Arm, Right Updated: 05/24/21 2142     Sodium 141 mmol/L      Potassium 3 8 mmol/L      Chloride 106 mmol/L      CO2 29 mmol/L      ANION GAP 6 mmol/L      BUN 10 mg/dL      Creatinine 1 11 mg/dL      Glucose 89 mg/dL      Calcium 9 3 mg/dL      AST 23 U/L      ALT 34 U/L      Alkaline Phosphatase 91 U/L      Total Protein 8 2 g/dL      Albumin 4 2 g/dL      Total Bilirubin 0 54 mg/dL      eGFR 110 ml/min/1 73sq m     Narrative:      Madhu guidelines for Chronic Kidney Disease (CKD):     Stage 1 with normal or high GFR (GFR > 90 mL/min/1 73 square meters)    Stage 2 Mild CKD (GFR = 60-89 mL/min/1 73 square meters)    Stage 3A Moderate CKD (GFR = 45-59 mL/min/1 73 square meters)    Stage 3B Moderate CKD (GFR = 30-44 mL/min/1 73 square meters)    Stage 4 Severe CKD (GFR = 15-29 mL/min/1 73 square meters)    Stage 5 End Stage CKD (GFR <15 mL/min/1 73 square meters)  Note: GFR calculation is accurate only with a steady state creatinine    CBC and differential [[id number]]  (Abnormal) Collected: 05/24/21 2057 Lab Status: Final result Specimen: Blood from Arm, Right Updated: 05/24/21 2113     WBC 5 42 Thousand/uL      RBC 4 93 Million/uL      Hemoglobin 14 4 g/dL      Hematocrit 42 7 %      MCV 87 fL      MCH 29 2 pg      MCHC 33 7 g/dL      RDW 11 9 %      MPV 9 9 fL      Platelets 296 Thousands/uL      nRBC 0 /100 WBCs      Neutrophils Relative 41 %      Immat GRANS % 0 %      Lymphocytes Relative 50 %      Monocytes Relative 7 %      Eosinophils Relative 1 %      Basophils Relative 1 %      Neutrophils Absolute 2 23 Thousands/µL      Immature Grans Absolute 0 02 Thousand/uL      Lymphocytes Absolute 2 67 Thousands/µL      Monocytes Absolute 0 37 Thousand/µL      Eosinophils Absolute 0 06 Thousand/µL      Basophils Absolute 0 07 Thousands/µL                  CTA dissection protocol chest/abdomen/pelvis   Final Result by Atul Shankar MD (05/25 0144)      1  No evidence of aortic aneurysm or dissection  No pulmonary embolism  2   Focal narrowing of the origin of the celiac artery which can be seen in the setting of median arcuate ligament syndrome, correlate clinically  Workstation performed: [id number]                    Procedures  Procedures         ED Course  ED Course as of May 25 0642   Tue May 25, 2021   0219 IMPRESSION:     1  No evidence of aortic aneurysm or dissection  No pulmonary embolism  2   Focal narrowing of the origin of the celiac artery which can be seen in the setting of median arcuate ligament syndrome, correlate clinically                                                MDM    Disposition  Final diagnoses:   Chest pain     Time reflects when diagnosis was documented in both MDM as applicable and the Disposition within this note     Time User Action Codes Description Comment    5/25/2021  2:22 AM Keegan TAN Add [R07 9] Chest pain       ED Disposition     ED Disposition Condition Date/Time Comment    Discharge Stable Tue May 25, 2021  2:22 AM Eva Baron discharge to home/self care             Follow-up Information     Follow up With Specialties Details Why [address] Surgery  In 1 day for evaluation of oossible median arcuate ligament syndrome Address: [address] [place] [address] [address]  Phone: [phone]          Discharge Medication List as of 5/25/2021  2:46 AM      CONTINUE these medications which have NOT CHANGED    Details   acetaminophen (TYLENOL) 500 mg tablet Take 2 tablets (1,000 mg total) by mouth every 6 (six) hours as needed for mild pain, Starting Sat 6/13/2020, Print      albuterol (Ventolin HFA) 90 mcg/act inhaler Inhale 2 puffs every 6 (six) hours as needed for wheezing, Starting Sat 8/22/2020, Normal      ibuprofen (MOTRIN) 400 mg tablet Take 1 tablet (400 mg total) by mouth every 6 (six) hours as needed for mild pain, Starting Sat 6/13/2020, Print      methylPREDNISolone 4 MG tablet therapy pack Use as directed on package, Normal           No discharge procedures on file      PDMP Review     None          ED Provider  Electronically Signed by           Bonifacio Maier MD  05/25/21 5307

## 2021-06-02 ENCOUNTER — CONSULT (OUTPATIENT)
Dept: SURGERY | Facility: CLINIC | Age: 21
End: 2021-06-02
Payer: COMMERCIAL

## 2021-06-02 VITALS
BODY MASS INDEX: 27.64 KG/M2 | DIASTOLIC BLOOD PRESSURE: 62 MMHG | WEIGHT: 172 LBS | HEART RATE: 83 BPM | HEIGHT: 66 IN | SYSTOLIC BLOOD PRESSURE: 128 MMHG | TEMPERATURE: 98.1 F

## 2021-06-02 DIAGNOSIS — R10.13 EPIGASTRIC PAIN: Primary | ICD-10-CM

## 2021-06-02 PROCEDURE — 99214 OFFICE O/P EST MOD 30 MIN: CPT | Performed by: SURGERY

## 2021-06-02 NOTE — PROGRESS NOTES
Office Visit - General Surgery  Josue Hauser MRN: [id number]  Encounter: [id number]    Assessment and Plan    Problem List Items Addressed This Visit        Other    Epigastric pain - Primary      I told  Him this is not celiac artery compression syndrome as per diagnosis on CT scan and from the emergency room  The son area does not fit  Told him I could do an ultrasound to make sure there was no small stones that were missed on CT scan  The other option would be to follow up with GI to evaluate stomach further  I will give him a call with results of the ultrasound see him back here if needed  Do not believe there is any surgical issue at this time  Relevant Orders    US abdomen complete          Chief Complaint:  Josue Hauser is a 24 y o  male who presents for Abdominal Pain (Consult median arcuate syndrome)    Subjective   24an year old  male who presents having recently been seen in the emergency room with epigastric pain  It was much worse and had been chances trip to the emergency room  He tells me it has been going on since his teenage years  Sometimes worse when lying down with his had lower than his feet  [place] in nature radiates straight through into his back  Nothing seems to make it better he is not sure what makes it worse or not  Comes and goes no inciting event that he can recollect  He has had some nausea and occasional chills no fever  He tends to have the diarrhea on occasion he had been seen by a gastroenterologist number of years ago with a thought that maybe celiac  He watched stye in seem to be okay for some time  Never had any endoscopies  Only study so far as CT scan    This showed a narrowing of the celiac artery with 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    Past Medical History  Past Medical History:   Diagnosis Date    No known health problems        Past Surgical History  Past Surgical History:   Procedure Laterality Date    NO PAST SURGERIES         Family History  Family History   Problem Relation Age of Onset    No Known Problems Mother     No Known Problems Father        Social History  Social History     Socioeconomic History    Marital status: Single     Spouse name: None    Number of children: None    Years of education: None    Highest education level: None   Occupational History    None   Social Needs    Financial resource strain: None    Food insecurity     Worry: None     Inability: None    Transportation needs     Medical: None     Non-medical: None   Tobacco Use    Smoking status: Current Every Day Smoker     Types: E-Cigarettes    Smokeless tobacco: Never Used   Substance and Sexual Activity    Alcohol use: Not Currently     Frequency: Never    Drug use: Never    Sexual activity: None   Lifestyle    Physical activity     Days per week: None     Minutes per session: None    Stress: None   Relationships    Social connections     Talks on phone: None     Gets together: None     Attends Oriental orthodox service: None     Active member of club or organization: None     Attends meetings of clubs or organizations: None     Relationship status: None    Intimate partner violence     Fear of current or ex partner: None     Emotionally abused: None     Physically abused: None     Forced sexual activity: None   Other Topics Concern    None   Social History Narrative    None        Medications  Current Outpatient Medications on File Prior to Visit   Medication Sig Dispense Refill    acetaminophen (TYLENOL) 500 mg tablet Take 2 tablets (1,000 mg total) by mouth every 6 (six) hours as needed for mild pain 30 tablet 0    albuterol (Ventolin HFA) 90 mcg/act inhaler Inhale 2 puffs every 6 (six) hours as needed for wheezing (Patient not taking: Reported on 6/2/2021) 18 g 0    ibuprofen (MOTRIN) 400 mg tablet Take 1 tablet (400 mg total) by mouth every 6 (six) hours as needed for mild pain (Patient not taking: Reported on 8/22/2020) 30 tablet 0    methylPREDNISolone 4 MG tablet therapy pack Use as directed on package (Patient not taking: Reported on 6/2/2021) 21 each 0     No current facility-administered medications on file prior to visit  Allergies  No Known Allergies    Review of Systems   Constitutional: Negative for chills, fatigue and fever  HENT: Negative for congestion, ear pain, sneezing, trouble swallowing and voice change  Eyes: Negative for pain and discharge  Respiratory: Negative for cough, shortness of breath and wheezing  Cardiovascular: Negative for palpitations and leg swelling  Gastrointestinal: Negative for abdominal pain, constipation, diarrhea, nausea and vomiting  Endocrine: Negative for cold intolerance, heat intolerance and polyuria  Genitourinary: Negative for decreased urine volume, difficulty urinating and dysuria  Musculoskeletal: Negative for arthralgias and back pain  Skin: Negative for rash and wound  Allergic/Immunologic: Negative for environmental allergies and food allergies  Neurological: Negative for dizziness and weakness  Hematological: Negative for adenopathy  Does not bruise/bleed easily  Psychiatric/Behavioral: Negative for confusion and sleep disturbance  The patient is not nervous/anxious  All other systems reviewed and are negative  Objective  Vitals:    06/02/21 1435   BP: 128/62   Pulse: 83   Temp: 98 1 °F (36 7 °C)       Physical Exam  Vitals signs and nursing note reviewed  Constitutional:       General: He is not in acute distress  Appearance: He is well-developed  He is not diaphoretic  HENT:      Head: Normocephalic and atraumatic  Right Ear: External ear normal       Left Ear: External ear normal    Eyes:      General: No scleral icterus  Conjunctiva/sclera: Conjunctivae normal    Neck:      Musculoskeletal: Normal range of motion and neck supple  Thyroid: No thyromegaly  Trachea: No tracheal deviation  Cardiovascular:      Rate and Rhythm: Normal rate and regular rhythm  Heart sounds: Normal heart sounds  No murmur  Pulmonary:      Effort: Pulmonary effort is normal  No respiratory distress  Breath sounds: Normal breath sounds  No wheezing or rales  Abdominal:      General: There is no distension  Palpations: Abdomen is soft  There is no mass  Tenderness: There is no abdominal tenderness  There is no guarding or rebound  Comments: Minimal epigastric and right upper quadrant tenderness   Musculoskeletal: Normal range of motion  Lymphadenopathy:      Cervical: No cervical adenopathy  Skin:     General: Skin is warm and dry  Neurological:      Mental Status: He is alert and oriented to person, place, and time  Psychiatric:         Behavior: Behavior normal          Thought Content:  Thought content normal          Judgment: Judgment normal

## 2021-06-02 NOTE — ASSESSMENT & PLAN NOTE
I told  Him this is not celiac artery compression syndrome as per diagnosis on CT scan and from the emergency room  The son area does not fit  Told him I could do an ultrasound to make sure there was no small stones that were missed on CT scan  The other option would be to follow up with GI to evaluate stomach further  I will give him a call with results of the ultrasound see him back here if needed  Do not believe there is any surgical issue at this time

## 2021-06-04 ENCOUNTER — HOSPITAL ENCOUNTER (OUTPATIENT)
Dept: ULTRASOUND IMAGING | Facility: HOSPITAL | Age: 21
Discharge: HOME | End: 2021-06-04
Attending: SURGERY
Payer: COMMERCIAL

## 2021-06-04 DIAGNOSIS — R10.13 EPIGASTRIC PAIN: ICD-10-CM

## 2021-06-04 PROCEDURE — 76700 US EXAM ABDOM COMPLETE: CPT

## 2021-06-14 ENCOUNTER — TELEPHONE (OUTPATIENT)
Dept: WOUND CARE | Facility: HOSPITAL | Age: 21
End: 2021-06-14

## 2021-06-14 NOTE — TELEPHONE ENCOUNTER
Left message he had sludge  Told him it would still be better to see GI before I would do anything surgically

## 2021-08-10 ENCOUNTER — HOSPITAL ENCOUNTER (EMERGENCY)
Facility: HOSPITAL | Age: 21
End: 2021-08-10
Attending: EMERGENCY MEDICINE
Payer: COMMERCIAL

## 2021-08-10 VITALS
TEMPERATURE: 98 F | OXYGEN SATURATION: 100 % | DIASTOLIC BLOOD PRESSURE: 64 MMHG | HEART RATE: 58 BPM | SYSTOLIC BLOOD PRESSURE: 137 MMHG | RESPIRATION RATE: 18 BRPM

## 2021-08-10 DIAGNOSIS — R45.851 SUICIDAL IDEATION: Primary | ICD-10-CM

## 2021-08-10 LAB
AMPHETAMINES SERPL QL SCN: NEGATIVE
BARBITURATES UR QL: NEGATIVE
BENZODIAZ UR QL: NEGATIVE
COCAINE UR QL: NEGATIVE
ETHANOL EXG-MCNC: 0 MG/DL
METHADONE UR QL: NEGATIVE
OPIATES UR QL SCN: NEGATIVE
OXYCODONE+OXYMORPHONE UR QL SCN: NEGATIVE
PCP UR QL: NEGATIVE
SARS-COV-2 RNA RESP QL NAA+PROBE: NEGATIVE
THC UR QL: NEGATIVE

## 2021-08-10 PROCEDURE — 99285 EMERGENCY DEPT VISIT HI MDM: CPT | Performed by: EMERGENCY MEDICINE

## 2021-08-10 PROCEDURE — U0005 INFEC AGEN DETEC AMPLI PROBE: HCPCS | Performed by: EMERGENCY MEDICINE

## 2021-08-10 PROCEDURE — 99285 EMERGENCY DEPT VISIT HI MDM: CPT

## 2021-08-10 PROCEDURE — 80307 DRUG TEST PRSMV CHEM ANLYZR: CPT | Performed by: EMERGENCY MEDICINE

## 2021-08-10 PROCEDURE — U0003 INFECTIOUS AGENT DETECTION BY NUCLEIC ACID (DNA OR RNA); SEVERE ACUTE RESPIRATORY SYNDROME CORONAVIRUS 2 (SARS-COV-2) (CORONAVIRUS DISEASE [COVID-19]), AMPLIFIED PROBE TECHNIQUE, MAKING USE OF HIGH THROUGHPUT TECHNOLOGIES AS DESCRIBED BY CMS-2020-01-R: HCPCS | Performed by: EMERGENCY MEDICINE

## 2021-08-10 PROCEDURE — 82075 ASSAY OF BREATH ETHANOL: CPT | Performed by: EMERGENCY MEDICINE

## 2021-08-10 NOTE — ED NOTES
Patient is accepted at [facility]  Patient is accepted by Dr Arley Atkinson per [facility]  Patient is going to the [place] unit    Awaiting time to transport       Nurse report is to be called to [phone] prior to patient transfer

## 2021-08-10 NOTE — ED NOTES
Mom took belongings home with her  Patient has eyeglasses at bedside        Adan Russell  08/10/21 1474

## 2021-08-10 NOTE — ED ATTENDING ATTESTATION
8/10/2021  Tomas Ambriz DO, saw and evaluated the patient  I have discussed the patient with the resident/non-physician practitioner and agree with the resident's/non-physician practitioner's findings, Plan of Care, and MDM as documented in the resident's/non-physician practitioner's note, except where noted  All available labs and Radiology studies were reviewed  I was present for key portions of any procedure(s) performed by the resident/non-physician practitioner and I was immediately available to provide assistance  At this point I agree with the current assessment done in the Emergency Department  I have conducted an independent evaluation of this patient a history and physical is as follows:    24 yom with SI  Plan would be cutting wrists in bath tub so it would be "easy to clean up "    Past Medical History:   Diagnosis Date    No known health problems     Suicide attempt ([facility] [address] ) 2020     /84   Pulse 84   Temp 98 °F (36 7 °C)   Resp 18   SpO2 98%   Depressed affect, A&Ox4, no resp distress, RRR, NROM      Medically stable for psych  Screening labs pending  201 and admit           ED Course         Critical Care Time  Procedures

## 2021-08-10 NOTE — EMTALA/ACUTE CARE TRANSFER
[facility] [address]  Dept: [phone]      TTLZLZ TRANSFER CONSENT    NAME Meaghan Greenfield                                         2000                              MRN [id number]    I have been informed of my rights regarding examination, treatment, and transfer   by Dr Yoseph Ware DO    Benefits:      Risks:        Consent for Transfer:  I acknowledge that my medical condition has been evaluated and explained to me by the emergency department physician or other qualified medical person and/or my attending physician, who has recommended that I be transferred to the service of  Accepting Physician: Jyotsna Single at [address] Name, [address] : [facility]  The above potential benefits of such transfer, the potential risks associated with such transfer, and the probable risks of not being transferred have been explained to me, and I fully understand them  The doctor has explained that, in my case, the benefits of transfer outweigh the risks  I agree to be transferred  I authorize the performance of emergency medical procedures and treatments upon me in both transit and upon arrival at the receiving facility  Additionally, I authorize the release of any and all medical records to the receiving facility and request they be transported with me, if possible  I understand that the safest mode of transportation during a medical emergency is an ambulance and that the Hospital advocates the use of this mode of transport  Risks of traveling to the receiving facility by car, including absence of medical control, life sustaining equipment, such as oxygen, and medical personnel has been explained to me and I fully understand them  (WHIT CORRECT BOX BELOW)  [  ]  I consent to the stated transfer and to be transported by ambulance/helicopter    [  ]  I consent to the stated transfer, but refuse transportation by ambulance and accept full responsibility for my transportation by car  I understand the risks of non-ambulance transfers and I exonerate the Hospital and its staff from any deterioration in my condition that results from this refusal     X___________________________________________    DATE  08/10/21  TIME________  Signature of patient or legally responsible individual signing on patient behalf           RELATIONSHIP TO PATIENT_________________________          Provider Certification    NAME Jesús Grey                                         2000                              MRN [id number]    A medical screening exam was performed on the above named patient  Based on the examination:    Condition Necessitating Transfer The encounter diagnosis was Suicidal ideation  Patient Condition:      Reason for Transfer:      Transfer Requirements: Facility [facility]   · Space available and qualified personnel available for treatment as acknowledged by    · Agreed to accept transfer and to provide appropriate medical treatment as acknowledged by       Zackary Raman  · Appropriate medical records of the examination and treatment of the patient are provided at the time of transfer   [address], [address] _______  · Transfer will be performed by qualified personnel from    and appropriate transfer equipment as required, including the use of necessary and appropriate life support measures      Provider Certification: I have examined the patient and explained the following risks and benefits of being transferred/refusing transfer to the patient/family:         Based on these reasonable risks and benefits to the patient and/or the unborn child(felix), and based upon the information available at the time of the patients examination, I certify that the medical benefits reasonably to be expected from the provision of appropriate medical treatments at another medical facility outweigh the increasing risks, if any, to the individuals medical condition, and in the case of labor to the unborn child, from effecting the transfer      X____________________________________________ DATE 08/10/21        TIME_______      ORIGINAL - SEND TO MEDICAL RECORDS   COPY - SEND WITH PATIENT DURING TRANSFER

## 2021-08-10 NOTE — ED PROVIDER NOTES
History  Chief Complaint   Patient presents with    Suicidal     pt has been dealing with feelings of SI for a few weeks, with idea to cut self in shower so that he doesnt leave a mess for others to clean up  pt has attempted SI in the past       26-year-old male with history of bipolar disorder presents to the emergency department for psychiatric evaluation  The patient reports that he started a new job approximately 3 weeks ago and since then has had increased depression with thoughts of suicide  States that last week he called a suicide hotline but has still been having suicidal thoughts  He reports his plan would be to slit his wrist in a bathtub  Reports that he has 1 prior suicide attempt [facility]  He reports that he was placed on medication for depression but he has not taken any medication in over a year  He states that he does not have access to a firearm  Denies HI and AVH  Patient states no medical complaints at this time  Patient reports that he would like to sign a 201 for inpatient behavioral health treatment  None       Past Medical History:   Diagnosis Date    No known health problems     Suicide attempt ([facility] [address] ) 2020       Past Surgical History:   Procedure Laterality Date    NO PAST SURGERIES         Family History   Problem Relation Age of Onset    No Known Problems Mother     No Known Problems Father      I have reviewed and agree with the history as documented  E-Cigarette/Vaping     E-Cigarette/Vaping Substances     Social History     Tobacco Use    Smoking status: Current Every Day Smoker     Types: E-Cigarettes    Smokeless tobacco: Never Used   Substance Use Topics    Alcohol use: Not Currently    Drug use: Never        Review of Systems   Constitutional: Negative for chills and fever  HENT: Negative for ear pain and sore throat  Eyes: Negative for pain and visual disturbance     Respiratory: Negative for cough and shortness of breath  Cardiovascular: Negative for chest pain and palpitations  Gastrointestinal: Negative for abdominal pain and vomiting  Genitourinary: Negative for dysuria and hematuria  Musculoskeletal: Negative for arthralgias and back pain  Skin: Negative for color change and rash  Neurological: Negative for seizures and syncope  Psychiatric/Behavioral: Positive for dysphoric mood and suicidal ideas  Negative for self-injury  All other systems reviewed and are negative  Physical Exam  ED Triage Vitals [08/10/21 1255]   Temperature Pulse Respirations Blood Pressure SpO2   98 °F (36 7 °C) 84 18 140/84 98 %      Temp src Heart Rate Source Patient Position - Orthostatic VS BP Location FiO2 (%)   -- -- -- -- --      Pain Score       No Pain             Orthostatic Vital Signs  Vitals:    08/10/21 1255 08/10/21 1859   BP: 140/84 137/64   Pulse: 84 58       Physical Exam  Vitals and nursing note reviewed  Constitutional:       Appearance: He is well-developed  HENT:      Head: Normocephalic and atraumatic  Eyes:      Conjunctiva/sclera: Conjunctivae normal    Cardiovascular:      Rate and Rhythm: Normal rate and regular rhythm  Heart sounds: No murmur heard  Pulmonary:      Effort: Pulmonary effort is normal  No respiratory distress  Breath sounds: Normal breath sounds  Abdominal:      Palpations: Abdomen is soft  Tenderness: There is no abdominal tenderness  Musculoskeletal:      Cervical back: Neck supple  Skin:     General: Skin is warm and dry  Neurological:      Mental Status: He is alert  Psychiatric:         Attention and Perception: He does not perceive auditory or visual hallucinations  Mood and Affect: Affect is blunt  Behavior: Behavior is withdrawn  Thought Content: Thought content includes suicidal ideation  Thought content does not include homicidal ideation  Thought content includes suicidal plan   Thought content does not include homicidal plan  Judgment: Judgment is inappropriate  ED Medications  Medications - No data to display    Diagnostic Studies  Results Reviewed     Procedure Component Value Units Date/Time    Novel Coronavirus (Covid-19),PCR [facility] - 2 Hour Stat [[id number]]  (Normal) Collected: 08/10/21 1334    Lab Status: Final result Specimen: Nares from Nose Updated: 08/10/21 1537     SARS-CoV-2 Negative    Narrative:           Rapid drug screen, urine [[id number]]  (Normal) Collected: 08/10/21 1325    Lab Status: Final result Specimen: Urine, Clean Catch Updated: 08/10/21 1422     Amph/Meth UR Negative     Barbiturate Ur Negative     Benzodiazepine Urine Negative     Cocaine Urine Negative     Methadone Urine Negative     Opiate Urine Negative     PCP Ur Negative     THC Urine Negative     Oxycodone Urine Negative    Narrative:      FOR MEDICAL PURPOSES ONLY  IF CONFIRMATION NEEDED PLEASE CONTACT THE LAB WITHIN 5 DAYS  Drug Screen Cutoff Levels:  AMPHETAMINE/METHAMPHETAMINES  1000 ng/mL  BARBITURATES     200 ng/mL  BENZODIAZEPINES     200 ng/mL  COCAINE      300 ng/mL  METHADONE      300 ng/mL  OPIATES      300 ng/mL  PHENCYCLIDINE     25 ng/mL  THC       50 ng/mL  OXYCODONE      100 ng/mL    POCT alcohol breath test [[id number]]  (Normal) Resulted: 08/10/21 1319    Lab Status: Final result Updated: 08/10/21 1319     EXTBreath Alcohol 0 000                 No orders to display         Procedures  Procedures      ED Course  ED Course as of Aug 11 1055   Tue Aug 10, 2021   1404 The patient is medically cleared for crisis evaluation                                SBIRT 20yo+      Most Recent Value   SBIRT (23 yo +)   In order to provide better care to our patients, we are screening all of our patients for alcohol and drug use  Would it be okay to ask you these screening questions?   No Filed at: 08/10/2021 1358                MDM  Number of Diagnoses or Management Options  Suicidal ideation  Diagnosis management comments: 80-year-old male presented to the emergency department for psychiatric evaluation  On arrival patient was awake, alert, oriented and in no acute distress  Initial vital signs were stable  Patient was expressing SI with a plan  Crisis was consulted and the patient signed a 201 form for inpatient behavioral health treatment  The patient is currently awaiting transport to a behavioral health facility  Disposition  Final diagnoses:   Suicidal ideation     Time reflects when diagnosis was documented in both MDM as applicable and the Disposition within this note     Time User Action Codes Description Comment    8/10/2021  2:04 PM Ryann Doing Add [H23 162] Suicidal ideation       ED Disposition     ED Disposition Condition Date/Time Comment    Transfer to [address] [place] [address] Aug 10, 2021  2:04 PM Aj Postin should be transferred out and has been medically cleared  MD Documentation      Most Recent Value   Accepting Physician  [address] [facility]  Name, Soy Roe   Sending MD Ambriz      RN Documentation      Most Recent Value   Accepting Facility Name, [address]   [facility]      Follow-up Information    None         There are no discharge medications for this patient  No discharge procedures on file  PDMP Review     None           ED Provider  Attending physically available and evaluated Aj Postin  I managed the patient along with the ED Attending      Electronically Signed by         Jody Isbell MD  08/11/21 0094

## 2021-08-10 NOTE — ED NOTES
There are no beds available in network  Faxed clinical to [facility] in [place] for review

## 2021-08-10 NOTE — ED NOTES
Pt reports increased depression and thoughts of suicide for the past few weeks  Pt currently has thoughts of cutting his wrists with a razor  He had a past attempt 1 5 years ago  Pt denies homicidal ideation or hallucinations  Pt works fulltime and lives with his mother and brothers  Pt made poor eye contact and had a flat affect  Pt was diagnosed bipolar but hasn't seen a psychiatrist or taken psych meds in over one year  Pt was unable to provide specific stressors  Pt reports poor sleep and fair appetite  He denies D&A use  Pt is willing to sign a 201 for inpatient tx  He is calm and cooperative

## 2021-08-11 NOTE — ED NOTES
attempted to call report to receiving nurse at this time  This RN transferred to different number with no answer          Julia Michelle RN  08/10/21 2016

## 2022-04-12 ENCOUNTER — APPOINTMENT (EMERGENCY)
Dept: RADIOLOGY | Facility: HOSPITAL | Age: 22
End: 2022-04-12
Payer: COMMERCIAL

## 2022-04-12 ENCOUNTER — HOSPITAL ENCOUNTER (EMERGENCY)
Facility: HOSPITAL | Age: 22
Discharge: HOME | End: 2022-04-12
Attending: EMERGENCY MEDICINE | Admitting: EMERGENCY MEDICINE
Payer: COMMERCIAL

## 2022-04-12 VITALS
OXYGEN SATURATION: 98 % | WEIGHT: 165 LBS | HEART RATE: 64 BPM | HEIGHT: 70 IN | SYSTOLIC BLOOD PRESSURE: 147 MMHG | DIASTOLIC BLOOD PRESSURE: 76 MMHG | RESPIRATION RATE: 18 BRPM | TEMPERATURE: 98.4 F | BODY MASS INDEX: 23.62 KG/M2

## 2022-04-12 DIAGNOSIS — B34.9 VIRAL SYNDROME: Primary | ICD-10-CM

## 2022-04-12 LAB
ALBUMIN SERPL BCP-MCNC: 4.8 G/DL (ref 3.5–5)
ALP SERPL-CCNC: 67 U/L (ref 34–104)
ALT SERPL W P-5'-P-CCNC: 27 U/L (ref 7–52)
ANION GAP SERPL CALCULATED.3IONS-SCNC: 9 MMOL/L (ref 4–13)
AST SERPL W P-5'-P-CCNC: 31 U/L (ref 13–39)
BASOPHILS # BLD AUTO: 0.06 THOUSANDS/ΜL (ref 0–0.1)
BASOPHILS NFR BLD AUTO: 1 % (ref 0–1)
BILIRUB SERPL-MCNC: 0.5 MG/DL (ref 0.2–1)
BUN SERPL-MCNC: 9 MG/DL (ref 5–25)
CALCIUM SERPL-MCNC: 9.8 MG/DL (ref 8.4–10.2)
CARDIAC TROPONIN I PNL SERPL HS: 2 NG/L
CHLORIDE SERPL-SCNC: 100 MMOL/L (ref 96–108)
CO2 SERPL-SCNC: 26 MMOL/L (ref 21–32)
CREAT SERPL-MCNC: 1.06 MG/DL (ref 0.6–1.3)
EOSINOPHIL # BLD AUTO: 0.05 THOUSAND/ΜL (ref 0–0.61)
EOSINOPHIL NFR BLD AUTO: 1 % (ref 0–6)
ERYTHROCYTE [DISTWIDTH] IN BLOOD BY AUTOMATED COUNT: 11.8 % (ref 11.6–15.1)
FLUAV RNA RESP QL NAA+PROBE: NEGATIVE
FLUBV RNA RESP QL NAA+PROBE: NEGATIVE
GFR SERPL CREATININE-BSD FRML MDRD: 99 ML/MIN/1.73SQ M
GLUCOSE SERPL-MCNC: 89 MG/DL (ref 65–140)
HCT VFR BLD AUTO: 43.1 % (ref 36.5–49.3)
HGB BLD-MCNC: 15.1 G/DL (ref 12–17)
IMM GRANULOCYTES # BLD AUTO: 0.01 THOUSAND/UL (ref 0–0.2)
IMM GRANULOCYTES NFR BLD AUTO: 0 % (ref 0–2)
LYMPHOCYTES # BLD AUTO: 1.72 THOUSANDS/ΜL (ref 0.6–4.47)
LYMPHOCYTES NFR BLD AUTO: 31 % (ref 14–44)
MCH RBC QN AUTO: 29.7 PG (ref 26.8–34.3)
MCHC RBC AUTO-ENTMCNC: 35 G/DL (ref 31.4–37.4)
MCV RBC AUTO: 85 FL (ref 82–98)
MONOCYTES # BLD AUTO: 0.41 THOUSAND/ΜL (ref 0.17–1.22)
MONOCYTES NFR BLD AUTO: 7 % (ref 4–12)
NEUTROPHILS # BLD AUTO: 3.34 THOUSANDS/ΜL (ref 1.85–7.62)
NEUTS SEG NFR BLD AUTO: 60 % (ref 43–75)
NRBC BLD AUTO-RTO: 0 /100 WBCS
PLATELET # BLD AUTO: 240 THOUSANDS/UL (ref 149–390)
PMV BLD AUTO: 10.5 FL (ref 8.9–12.7)
POTASSIUM SERPL-SCNC: 4.2 MMOL/L (ref 3.5–5.3)
PROT SERPL-MCNC: 8.1 G/DL (ref 6.4–8.4)
RBC # BLD AUTO: 5.09 MILLION/UL (ref 3.88–5.62)
RSV RNA RESP QL NAA+PROBE: NEGATIVE
S PYO DNA THROAT QL NAA+PROBE: NOT DETECTED
SARS-COV-2 RNA RESP QL NAA+PROBE: NEGATIVE
SODIUM SERPL-SCNC: 135 MMOL/L (ref 135–147)
WBC # BLD AUTO: 5.59 THOUSAND/UL (ref 4.31–10.16)

## 2022-04-12 PROCEDURE — 71045 X-RAY EXAM CHEST 1 VIEW: CPT

## 2022-04-12 PROCEDURE — 99285 EMERGENCY DEPT VISIT HI MDM: CPT

## 2022-04-12 PROCEDURE — 36415 COLL VENOUS BLD VENIPUNCTURE: CPT | Performed by: STUDENT IN AN ORGANIZED HEALTH CARE EDUCATION/TRAINING PROGRAM

## 2022-04-12 PROCEDURE — 96360 HYDRATION IV INFUSION INIT: CPT

## 2022-04-12 PROCEDURE — 84484 ASSAY OF TROPONIN QUANT: CPT | Performed by: STUDENT IN AN ORGANIZED HEALTH CARE EDUCATION/TRAINING PROGRAM

## 2022-04-12 PROCEDURE — 85025 COMPLETE CBC W/AUTO DIFF WBC: CPT | Performed by: STUDENT IN AN ORGANIZED HEALTH CARE EDUCATION/TRAINING PROGRAM

## 2022-04-12 PROCEDURE — 99285 EMERGENCY DEPT VISIT HI MDM: CPT | Performed by: STUDENT IN AN ORGANIZED HEALTH CARE EDUCATION/TRAINING PROGRAM

## 2022-04-12 PROCEDURE — 80053 COMPREHEN METABOLIC PANEL: CPT | Performed by: STUDENT IN AN ORGANIZED HEALTH CARE EDUCATION/TRAINING PROGRAM

## 2022-04-12 PROCEDURE — 93005 ELECTROCARDIOGRAM TRACING: CPT

## 2022-04-12 PROCEDURE — 87651 STREP A DNA AMP PROBE: CPT | Performed by: STUDENT IN AN ORGANIZED HEALTH CARE EDUCATION/TRAINING PROGRAM

## 2022-04-12 PROCEDURE — 0241U HB NFCT DS VIR RESP RNA 4 TRGT: CPT | Performed by: STUDENT IN AN ORGANIZED HEALTH CARE EDUCATION/TRAINING PROGRAM

## 2022-04-12 RX ORDER — ACETAMINOPHEN 325 MG/1
650 TABLET ORAL ONCE
Status: COMPLETED | OUTPATIENT
Start: 2022-04-12 | End: 2022-04-12

## 2022-04-12 RX ADMIN — SODIUM CHLORIDE 1000 ML: 0.9 INJECTION, SOLUTION INTRAVENOUS at 22:33

## 2022-04-12 RX ADMIN — ACETAMINOPHEN 650 MG: 325 TABLET ORAL at 23:32

## 2022-04-12 NOTE — Clinical Note
Vida Gonzalez was seen and treated in our emergency department on 4/12/2022  Diagnosis:     Stanford Patel  may return to work on return date  He may return on this date: 04/14/2022         If you have any questions or concerns, please don't hesitate to call        Reinaldo Galaviz PA-C    ______________________________           _______________          _______________  Hospital Representative                              Date                                Time

## 2022-04-13 NOTE — DISCHARGE INSTRUCTIONS
Please ensure adequate hydration  Can take motrin (600mg every 8 hours, take with food) and tylenol every 6 hours as needed for pain  Please follow up with your PCP to ensure resolution of symptoms  Please return to the ED with any new or worsening symptoms

## 2022-04-13 NOTE — ED PROVIDER NOTES
History  Chief Complaint   Patient presents with    Weakness - Generalized     weakness and sore throat that started this am     PMHx: depression with previous suicide attempt  PSH: none    Roxana Roberts is a 24year old male who presents to the ED with sore throat, malaise, myalgias, chills, decreased appetite, and generalized weakness that began this morning and worsened throughout the day, states tried antihistamine this morning with minimal improvement in sore throat  Patient also noted chest "heaviness" that developed this afternoon, denies CP and SOB  Patient states multiple coworkers have been out sick  Patient states has not been able to take his temperature  Denies any other associated symptoms including lightheadedness, syncope, cough, dysphagia, drooling, voice change, SOB, CP, abdominal pain, n/v/d, urinary symptoms, numbness/tingling, HA, or any other concerns at this time  History provided by:  Patient and medical records   used: No        None       Past Medical History:   Diagnosis Date    No known health problems     Suicide attempt ([facility] [address] ) 2020       Past Surgical History:   Procedure Laterality Date    NO PAST SURGERIES         Family History   Problem Relation Age of Onset    No Known Problems Mother     No Known Problems Father      I have reviewed and agree with the history as documented  E-Cigarette/Vaping    E-Cigarette Use Current Every Day User      E-Cigarette/Vaping Substances     Social History     Tobacco Use    Smoking status: Current Every Day Smoker     Types: E-Cigarettes    Smokeless tobacco: Never Used   Vaping Use    Vaping Use: Every day   Substance Use Topics    Alcohol use: Not Currently    Drug use: Never       Review of Systems   Constitutional: Positive for appetite change and chills  Negative for fever  HENT: Positive for sore throat  Negative for congestion, drooling, ear pain, trouble swallowing and voice change      Eyes: Negative for pain and visual disturbance  Respiratory: Negative for cough and shortness of breath  Cardiovascular: Negative for chest pain and palpitations  Gastrointestinal: Negative for abdominal pain, diarrhea, nausea and vomiting  Genitourinary: Negative for dysuria and frequency  Musculoskeletal: Positive for myalgias  Negative for arthralgias, back pain, gait problem, joint swelling, neck pain and neck stiffness  Skin: Negative for color change and rash  Neurological: Positive for weakness  Negative for syncope, light-headedness, numbness and headaches  Physical Exam  Physical Exam  Vitals and nursing note reviewed  Constitutional:       General: He is not in acute distress  Appearance: Normal appearance  He is well-developed  He is not ill-appearing  Comments: Non-toxic appearing, speaking in full sentences, resting comfortably on stretcher, VSS   HENT:      Head: Normocephalic and atraumatic  Right Ear: Tympanic membrane, ear canal and external ear normal       Left Ear: Tympanic membrane, ear canal and external ear normal       Ears:      Comments: No mastoid TTP, erythema, or swelling b/l     Nose: Nose normal  No congestion or rhinorrhea  Mouth/Throat:      Mouth: Mucous membranes are moist       Pharynx: Oropharynx is clear  Uvula midline  Posterior oropharyngeal erythema present  No pharyngeal swelling, oropharyngeal exudate or uvula swelling  Tonsils: No tonsillar exudate or tonsillar abscesses  Eyes:      General:         Right eye: No discharge  Left eye: No discharge  Conjunctiva/sclera: Conjunctivae normal    Neck:      Comments: No meningeal signs  Cardiovascular:      Rate and Rhythm: Normal rate and regular rhythm  Heart sounds: No murmur heard  No friction rub  No gallop  Pulmonary:      Effort: Pulmonary effort is normal  No respiratory distress  Breath sounds: Normal breath sounds  No stridor  No wheezing, rhonchi or rales  Abdominal:      General: Abdomen is flat  Bowel sounds are normal  There is no distension  Palpations: Abdomen is soft  Tenderness: There is no abdominal tenderness  There is no guarding or rebound  Genitourinary:     Comments: Deferred  Musculoskeletal:         General: No swelling, deformity or signs of injury  Normal range of motion  Cervical back: Normal range of motion and neck supple  No tenderness  Right lower leg: No edema  Left lower leg: No edema  Skin:     General: Skin is warm and dry  Capillary Refill: Capillary refill takes less than 2 seconds  Findings: No bruising, erythema or rash  Neurological:      General: No focal deficit present  Mental Status: He is alert and oriented to person, place, and time     Psychiatric:         Mood and Affect: Mood normal          Vital Signs  ED Triage Vitals   Temperature Pulse Respirations Blood Pressure SpO2   04/12/22 2200 04/12/22 2200 04/12/22 2200 04/12/22 2200 04/12/22 2200   98 4 °F (36 9 °C) 72 18 139/89 100 %      Temp Source Heart Rate Source Patient Position - Orthostatic VS BP Location FiO2 (%)   04/12/22 2200 04/12/22 2240 -- -- --   Oral Monitor         Pain Score       04/12/22 2200       No Pain           Vitals:    04/12/22 2200 04/12/22 2240   BP: 139/89 147/76   Pulse: 72 64         Visual Acuity      ED Medications  Medications   sodium chloride 0 9 % bolus 1,000 mL (0 mL Intravenous Stopped 4/12/22 2334)   acetaminophen (TYLENOL) tablet 650 mg (650 mg Oral Given 4/12/22 2332)       Diagnostic Studies  Results Reviewed     Procedure Component Value Units Date/Time    COVID/FLU/RSV - 2 hour TAT [[id number]]  (Normal) Collected: 04/12/22 2232    Lab Status: Final result Specimen: Nares from Nasopharyngeal Swab Updated: 04/12/22 2319     SARS-CoV-2 Negative     INFLUENZA A PCR Negative     INFLUENZA B PCR Negative     RSV PCR Negative    Narrative:      FOR PEDIATRIC PATIENTS - copy/paste COVID Guidelines URL to browser: [email or web address]    SARS-CoV-2 assay is a Nucleic Acid Amplification assay intended for the  qualitative detection of nucleic acid from SARS-CoV-2 in nasopharyngeal  swabs  Results are for the presumptive identification of SARS-CoV-2 RNA  Positive results are indicative of infection with SARS-CoV-2, the virus  causing COVID-19, but do not rule out bacterial infection or co-infection  with other viruses  Laboratories within the [place] and its  territories are required to report all positive results to the appropriate  public health authorities  Negative results do not preclude SARS-CoV-2  infection and should not be used as the sole basis for treatment or other  patient management decisions  Negative results must be combined with  clinical observations, patient history, and epidemiological information  This test has not been FDA cleared or approved  This test has been authorized by FDA under an Emergency Use Authorization  (EUA)  This test is only authorized for the duration of time the  declaration that circumstances exist justifying the authorization of the  emergency use of an in vitro diagnostic tests for detection of SARS-CoV-2  virus and/or diagnosis of COVID-19 infection under section 564(b)(1) of  the Act, 21 U  S C  070JRT-6(K)(0), unless the authorization is terminated  or revoked sooner  The test has been validated but independent review by FDA  and CLIA is pending  Test performed using [employer] GeneXpert: This RT-PCR assay targets N2,  a region unique to SARS-CoV-2  A conserved region in the E-gene was chosen  for pan-Sarbecovirus detection which includes SARS-CoV-2      HS Troponin 0hr (reflex protocol) [[id number]]  (Normal) Collected: 04/12/22 2232    Lab Status: Final result Specimen: Blood from Arm, Left Updated: 04/12/22 2315     hs TnI 0hr 2 ng/L     Comprehensive metabolic panel [[id number]] Collected: 04/12/22 2232    Lab Status: Final result Specimen: Blood from Arm, Left Updated: 04/12/22 2307     Sodium 135 mmol/L      Potassium 4 2 mmol/L      Chloride 100 mmol/L      CO2 26 mmol/L      ANION GAP 9 mmol/L      BUN 9 mg/dL      Creatinine 1 06 mg/dL      Glucose 89 mg/dL      Calcium 9 8 mg/dL      AST 31 U/L      ALT 27 U/L      Alkaline Phosphatase 67 U/L      Total Protein 8 1 g/dL      Albumin 4 8 g/dL      Total Bilirubin 0 50 mg/dL      eGFR 99 ml/min/1 73sq m     Narrative:      National Kidney Disease Foundation guidelines for Chronic Kidney Disease (CKD):     Stage 1 with normal or high GFR (GFR > 90 mL/min/1 73 square meters)    Stage 2 Mild CKD (GFR = 60-89 mL/min/1 73 square meters)    Stage 3A Moderate CKD (GFR = 45-59 mL/min/1 73 square meters)    Stage 3B Moderate CKD (GFR = 30-44 mL/min/1 73 square meters)    Stage 4 Severe CKD (GFR = 15-29 mL/min/1 73 square meters)    Stage 5 End Stage CKD (GFR <15 mL/min/1 73 square meters)  Note: GFR calculation is accurate only with a steady state creatinine    Strep A PCR [[id number]]  (Normal) Collected: 04/12/22 2232    Lab Status: Final result Specimen: Throat Updated: 04/12/22 2305     STREP A PCR Not Detected    CBC and differential [[id number]] Collected: 04/12/22 2232    Lab Status: Final result Specimen: Blood from Arm, Left Updated: 04/12/22 2257     WBC 5 59 Thousand/uL      RBC 5 09 Million/uL      Hemoglobin 15 1 g/dL      Hematocrit 43 1 %      MCV 85 fL      MCH 29 7 pg      MCHC 35 0 g/dL      RDW 11 8 %      MPV 10 5 fL      Platelets 138 Thousands/uL      nRBC 0 /100 WBCs      Neutrophils Relative 60 %      Immat GRANS % 0 %      Lymphocytes Relative 31 %      Monocytes Relative 7 %      Eosinophils Relative 1 %      Basophils Relative 1 %      Neutrophils Absolute 3 34 Thousands/µL      Immature Grans Absolute 0 01 Thousand/uL      Lymphocytes Absolute 1 72 Thousands/µL      Monocytes Absolute 0 41 Thousand/µL      Eosinophils Absolute 0 05 Thousand/µL      Basophils Absolute 0 06 Thousands/µL                  XR chest 1 view portable   ED Interpretation by Chace Lorenzo PA-C (04/12 2312)   No infiltrate, pleural effusion, or pneumothorax  Procedures  ECG 12 Lead Documentation Only    Date/Time: 4/12/2022 10:32 PM  Performed by: Chace Lorenzo PA-C  Authorized by: Chace Lorenzo PA-C     Previous ECG:     Previous ECG:  Unavailable  Rate:     ECG rate:  72  Rhythm:     Rhythm: sinus rhythm    Ectopy:     Ectopy: none    QRS:     QRS axis:  Normal  Conduction:     Conduction: normal    ST segments:     ST segments:  Normal  T waves:     T waves: normal    Other findings:     Other findings: early repolarization    Comments:      No acute ischemic changes             ED Course                       PERC Rule for PE      Most Recent Value   PERC Rule for PE    Age >=50 0 Filed at: 04/12/2022 2231   HR >=100 0 Filed at: 04/12/2022 2231   O2 Sat on room air < 95% 0 Filed at: 04/12/2022 2231   History of PE or DVT 0 Filed at: 04/12/2022 2231   Recent trauma or surgery 0 Filed at: 04/12/2022 2231   Hemoptysis 0 Filed at: 04/12/2022 2231   Exogenous estrogen 0 Filed at: 04/12/2022 2231   Unilateral leg swelling 0 Filed at: 04/12/2022 2231   PERC Rule for PE Results 0 Filed at: 04/12/2022 2231              SBIRT 22yo+      Most Recent Value   SBIRT (23 yo +)    In order to provide better care to our patients, we are screening all of our patients for alcohol and drug use  Would it be okay to ask you these screening questions? No Filed at: 04/12/2022 2207                    MDM  Number of Diagnoses or Management Options  Viral syndrome  Diagnosis management comments: Patient is a 24year old female who presents to the ED with sore throat, malaise, myalgias, chills, decreased appetite, and generalized weakness x 1 day, chest "heaviness" that developed this afternoon, + sick contacts, denies any other associated symptoms   Exam wnl  No evidence of OM, OE, mastoiditis, meningitis, tonsillitis, tonsillar abscess, ludwigs angina  Abdomen soft, non-tender  COVID, flu, RSV, strep negative  CXR with NAD  EKG with NSR, no acute ischemic changes or evidence of pericarditis, trop wnl  PERC 0; PE unlikely  No h/o CHF or evidence of fluid overload  Labs wnl  Symptoms likely 2/2 viral URI  Patient well appearing, VSS, stable for discharge      -motrin/tylenol PRN  -ensure adequate hydration  -f/u with PCP  -strict ED return precautions discussed     Results discussed with patient, who verbalized understanding and agreement with the management plan  Strict ED return instructions were discussed at bedside and all questions were answered  Prior to discharge, I provided both verbal and written instructions of the management plan and the signs and symptoms that should prompt the patient to return to the ED  All questions were answered and the patient was comfortable with the plan of care and discharged home  The patient agrees to return to the Emergency Department for concerns and/or progression of illness  Amount and/or Complexity of Data Reviewed  Clinical lab tests: ordered and reviewed  Tests in the radiology section of CPT®: ordered and reviewed  Independent visualization of images, tracings, or specimens: yes (CXR, EKG)    Patient Progress  Patient progress: stable      Disposition  Final diagnoses:   Viral syndrome     Time reflects when diagnosis was documented in both MDM as applicable and the Disposition within this note     Time User Action Codes Description Comment    4/12/2022 11:23 PM Rupinder Sehlton Add [B34 9] Viral syndrome       ED Disposition     ED Disposition Condition Date/Time Comment    Discharge Stable Tue Apr 12, 2022 11:23 PM Marichuy Saldana discharge to home/self care              Follow-up Information     Follow up With Specialties Details Why Contact Info Additional Information    Your PCP  Schedule an appointment as soon as possible for a visit in 3 days       [facility] Schedule an appointment as soon as possible for a visit  As needed NEENA Cage [address] [facility] [address]  [facility] [address] [place] [address], [address] [facility] [address] [facility], [address], [address], [address] [facility]  Emergency Department Emergency Medicine  If symptoms worsen [address] [facility],[address] [id number]  [address] [place] [address] Emergency Department, [address], [address] [place] [address]          There are no discharge medications for this patient  No discharge procedures on file      PDMP Review     None          ED Provider  Electronically Signed by           Amy Bolton PA-C  04/13/22 0010

## 2022-04-14 LAB
ATRIAL RATE: 71 BPM
P AXIS: 73 DEGREES
PR INTERVAL: 162 MS
QRS AXIS: 75 DEGREES
QRSD INTERVAL: 101 MS
QT INTERVAL: 395 MS
QTC INTERVAL: 433 MS
T WAVE AXIS: 41 DEGREES
VENTRICULAR RATE: 72 BPM

## 2022-04-14 PROCEDURE — 93010 ELECTROCARDIOGRAM REPORT: CPT | Performed by: INTERNAL MEDICINE

## 2022-07-18 ENCOUNTER — NURSE TRIAGE (OUTPATIENT)
Dept: OTHER | Facility: OTHER | Age: 22
End: 2022-07-18

## 2022-07-18 ENCOUNTER — TELEMEDICINE (OUTPATIENT)
Dept: FAMILY MEDICINE CLINIC | Facility: CLINIC | Age: 22
End: 2022-07-18
Payer: COMMERCIAL

## 2022-07-18 DIAGNOSIS — U07.1 COVID-19: Primary | ICD-10-CM

## 2022-07-18 PROCEDURE — 99213 OFFICE O/P EST LOW 20 MIN: CPT | Performed by: FAMILY MEDICINE

## 2022-07-18 NOTE — TELEPHONE ENCOUNTER
Reason for Disposition   [1] CLOSE CONTACT COVID-19 EXPOSURE within last 14 days AND [2] needs COVID-19 lab test to return to work AND [3] NO symptoms    Answer Assessment - Initial Assessment Questions  1  COVID-19 EXPOSURE: "Please describe how you were exposed to someone with a COVID-19 infection "      Yes   2  PLACE of CONTACT: "Where were you when you were exposed to COVID-19?" (e g , home, school, medical waiting room; which city?)     On trip    3  TYPE of CONTACT: "How much contact was there?" (e g , sitting next to, live in same house, work in same office, same bung)     unsure  4  DURATION of CONTACT: "How long were you in contact with the COVID-19 patient?" (e g , a few seconds, passed by person, a few minutes, 15 minutes or longer, live with the patient)      undure  5  MASK: "Were you wearing a mask?" "Was the other person wearing a mask?" Note: wearing a mask reduces the risk of an otherwise close contact  yed  6  DATE of CONTACT: "When did you have contact with a COVID-19 patient?" (e g , how many days ago)      *No Answer*  7  COMMUNITY SPREAD: "Are there lots of cases of COVID-19 (community spread) where you live?" (See public health department website, if unsure)        *No Answer*  8  SYMPTOMS: "Do you have any symptoms?" (e g , fever, cough, breathing difficulty, loss of taste or smell)      *No Answer*  9  VACCINE: "Have you gotten the COVID-19 vaccine?" If Yes, ask: "Which one, how many shots, when did you get it?"    yes  10   BOOSTER: "Have you received your COVID-19 booster?" If Yes, ask: "Which one and when did you get it?"       yes    Protocols used: CORONAVIRUS (COVID-19) EXPOSURE-ADULT-

## 2022-07-18 NOTE — PROGRESS NOTES
COVID-19 Outpatient Progress Note    Assessment/Plan:    Problem List Items Addressed This Visit        Other    COVID-19 - Primary     -Patient reports exposure from his mother who tested [phone] (+) 7/17/22, so patient tested at home and was also COVID-19 (+)  -Symptoms are mild and started yesterday around the time of testing  -Declines any treatment at this time  -Patient reports receiving J&J primary series but no records available  -Recommend quarantine for at least 5 days and then masking for an additional 5 days thereafter  -Note written for patient to give to employer regarding need for isolation, may return to work on 7/25/22 pending clinical improvement  -Encouraged patient to consider COVID-19 booster                Disposition:     Patient is fully vaccinated and I recommended self quarantine for 5 days followed by strict mask use for an additional 5 days  If patient were to develop symptoms, they should immediately self isolate and call our office for further guidance  Patient reports having received J&J vaccine but is not boosted; no records of primary vaccine available, only reported by patient  Symptoms are very mild and patient is not interested in any treatment at this time, just needs note for work  I have spent 15 minutes directly with the patient  Encounter provider Calli Raya MD    Provider located at [address] [place] [address]  [phone]    Recent Visits  No visits were found meeting these conditions  Showing recent visits within past 7 days and meeting all other requirements  Future Appointments  No visits were found meeting these conditions  Showing future appointments within next 150 days and meeting all other requirements     This virtual check-in was done via telephone and he agrees to proceed      Patient agrees to participate in a virtual check in via telephone or video visit instead of presenting to the office to address urgent/immediate medical needs  Patient is aware this is a billable service  After connecting through Telephone, the patient was identified by name and date of birth  [facility] was informed that this was a telemedicine visit and that the exam was being conducted confidentially over secure lines  My office door was closed  No one else was in the room  Rodolfo Martinez acknowledged consent and understanding of privacy and security of the telemedicine visit  I informed the patient that I have reviewed his record in Epic and presented the opportunity for him to ask any questions regarding the visit today  The patient agreed to participate  It was my intent to perform this visit via video technology but the patient was not able to do a video connection so the visit was completed via audio telephone only  Verification of patient location:  Patient is located in the following state in which I hold an active license: [place]    Subjective:   Cascadia Michelle is a 25 y o  male who is concerned about COVID-19  Patient's symptoms include fatigue, nasal congestion, sore throat, abdominal pain, nausea, diarrhea, myalgias and headache   Patient denies fever, chills, rhinorrhea, anosmia, loss of taste, cough, shortness of breath, chest tightness and vomiting      - Date of symptom onset: 7/17/2022      COVID-19 vaccination status: Fully vaccinated (primary series)    Exposure:   Contact with a person who is under investigation (PUI) for or who is positive for COVID-19 within the last 14 days?: Yes    Hospitalized recently for fever and/or lower respiratory symptoms?: No      Currently a healthcare worker that is involved in direct patient care?: No      Works in a special setting where the risk of COVID-19 transmission may be high? (this may include long-term care, correctional and halfway facilities; homeless shelters; assisted-living facilities and group homes ): No      Resident in a special setting where the risk of COVID-19 transmission may be high? (this may include long-term care, correctional and [employer] facilities; homeless shelters; assisted-living facilities and group homes ): No      Patient's mom tested (+) for COVID yesterday 7/17/22 so he tested himself and he tested positive at home 7/17/22 as well  Unable to provide image of positive test as he threw it away yesterday after he got the result  Symptoms started yesterday and are mild today  Needs note for work stating that he tested positive  Reports he had J&J vaccine early on in pandemic but not on file  Lab Results   Component Value Date    SARSCOV2 Negative 04/12/2022    SARSCOV2 Detected (A) 01/05/2021     Past Medical History:   Diagnosis Date    No known health problems     Suicide attempt ([facility] [address] ) 2020     Past Surgical History:   Procedure Laterality Date    NO PAST SURGERIES       No current outpatient medications on file  No current facility-administered medications for this visit  No Known Allergies    Review of Systems   Constitutional: Positive for fatigue  Negative for chills and fever  HENT: Positive for congestion and sore throat  Negative for rhinorrhea  Respiratory: Negative for cough, chest tightness and shortness of breath  Gastrointestinal: Positive for abdominal pain, diarrhea and nausea  Negative for vomiting  Musculoskeletal: Positive for myalgias  Neurological: Positive for headaches  Objective: There were no vitals filed for this visit  Physical Exam   [Physical exam limited 2/2 telephone encounter, but no conversational dyspnea or distress noted]    VIRTUAL VISIT [address] [place] [address] verbally agrees to participate in [employer]   Pt is aware that [employer] could be limited without vital signs or the ability to perform a full hands-on physical exam  Antonio Riddle understands he or the provider may request at any time to terminate the video visit and request the patient to seek care or treatment in person

## 2022-07-18 NOTE — LETTER
July 18, 2022     Patient: Kalpana Dobson  YOB: 2000  Date of Visit: 7/18/2022      To Whom it May Concern:    Jorge Melendez is under my professional care  Yuni Noland was seen via telemedicine visit on 7/18/2022  Please excuse Yuni Noland from work 7/17/22-7/24/22, as he tested positive at home for COVID-19 on 7/17/22 and will need to quarantine at home through 7/24/22  If his symptoms have improved, including remaining afebrile >24H without the use of anti-pyretics, he should be able to return to work on 7/25/22  If you have any questions or concerns, please don't hesitate to call           Sincerely,          Brianna Colon MD

## 2022-07-18 NOTE — TELEPHONE ENCOUNTER
Regarding: Non-[employer]/ Covid positive/ medical advice  ----- Message from Jelani Hollis sent at 7/18/2022  1:11 AM EDT -----  " I just tested positive for covid   I am not sure what to do "

## 2022-07-25 PROBLEM — U07.1 COVID-19: Status: ACTIVE | Noted: 2022-07-25

## 2022-07-25 NOTE — ASSESSMENT & PLAN NOTE
-Patient reports exposure from his mother who tested COVID-19 (+) 7/17/22, so patient tested at home and was also COVID-19 (+)  -Symptoms are mild and started yesterday around the time of testing  -Declines any treatment at this time  -Patient reports receiving J&J primary series but no records available  -Recommend quarantine for at least 5 days and then masking for an additional 5 days thereafter  -Note written for patient to give to employer regarding need for isolation, may return to work on 7/25/22 pending clinical improvement  -Encouraged patient to consider COVID-19 booster

## 2023-05-21 NOTE — ED NOTES
Insurance Authorization for admission:   Phone call placed to [facility] number: [phone]    Spoke to [employer]     3 days approved    Level of care: inpatient psych   Review on 8-12-21  Authorization # [id number] No

## 2024-05-16 ENCOUNTER — TELEPHONE (OUTPATIENT)
Dept: FAMILY MEDICINE CLINIC | Facility: CLINIC | Age: 24
End: 2024-05-16

## 2024-05-16 NOTE — TELEPHONE ENCOUNTER
Left a voicemail on patients phone to call and schedule an appointment with the office due to not being seen in year or longer      Thank you   Angelita SANDERS

## 2024-05-21 ENCOUNTER — APPOINTMENT (EMERGENCY)
Dept: RADIOLOGY | Facility: HOSPITAL | Age: 24
End: 2024-05-21
Payer: COMMERCIAL

## 2024-05-21 ENCOUNTER — HOSPITAL ENCOUNTER (EMERGENCY)
Facility: HOSPITAL | Age: 24
Discharge: HOME | End: 2024-05-21
Attending: EMERGENCY MEDICINE
Payer: COMMERCIAL

## 2024-05-21 VITALS
SYSTOLIC BLOOD PRESSURE: 154 MMHG | OXYGEN SATURATION: 99 % | DIASTOLIC BLOOD PRESSURE: 73 MMHG | TEMPERATURE: 98.6 F | RESPIRATION RATE: 20 BRPM | HEART RATE: 102 BPM
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
## 2024-05-21 PROCEDURE — 73590 X-RAY EXAM OF LOWER LEG: CPT

## 2024-05-21 PROCEDURE — 90471 IMMUNIZATION ADMIN: CPT

## 2024-05-21 PROCEDURE — 99285 EMERGENCY DEPT VISIT HI MDM: CPT | Performed by: EMERGENCY MEDICINE

## 2024-05-21 PROCEDURE — 96372 THER/PROPH/DIAG INJ SC/IM: CPT

## 2024-05-21 PROCEDURE — 73130 X-RAY EXAM OF HAND: CPT

## 2024-05-21 PROCEDURE — 90715 TDAP VACCINE 7 YRS/> IM: CPT

## 2024-05-21 PROCEDURE — 99283 EMERGENCY DEPT VISIT LOW MDM: CPT

## 2024-05-21 RX ORDER — AMOXICILLIN AND CLAVULANATE POTASSIUM 875; 125 MG/1; MG/1
1 TABLET, FILM COATED ORAL ONCE
Status: COMPLETED | OUTPATIENT
Start: 2024-05-21 | End: 2024-05-21

## 2024-05-21 RX ORDER — GINSENG 100 MG
1 CAPSULE ORAL 2 TIMES DAILY
Qty: 28 G | Refills: 0 | Status: SHIPPED | OUTPATIENT
Start: 2024-05-21

## 2024-05-21 RX ORDER — AMOXICILLIN AND CLAVULANATE POTASSIUM 875; 125 MG/1; MG/1
1 TABLET, FILM COATED ORAL EVERY 12 HOURS
Qty: 14 TABLET | Refills: 0 | Status: SHIPPED | OUTPATIENT
Start: 2024-05-21 | End: 2024-05-31

## 2024-05-21 RX ORDER — KETOROLAC TROMETHAMINE 30 MG/ML
30 INJECTION, SOLUTION INTRAMUSCULAR; INTRAVENOUS ONCE
Status: COMPLETED | OUTPATIENT
Start: 2024-05-21 | End: 2024-05-21

## 2024-05-21 RX ORDER — ACETAMINOPHEN 325 MG/1
975 TABLET ORAL ONCE
Status: COMPLETED | OUTPATIENT
Start: 2024-05-21 | End: 2024-05-21

## 2024-05-21 RX ADMIN — TETANUS TOXOID, REDUCED DIPHTHERIA TOXOID AND ACELLULAR PERTUSSIS VACCINE, ADSORBED 0.5 ML: 5; 2.5; 8; 8; 2.5 SUSPENSION INTRAMUSCULAR at 01:49

## 2024-05-21 RX ADMIN — AMOXICILLIN AND CLAVULANATE POTASSIUM 1 TABLET: 875; 125 TABLET, FILM COATED ORAL at 01:49

## 2024-05-21 RX ADMIN — KETOROLAC TROMETHAMINE 30 MG: 30 INJECTION, SOLUTION INTRAMUSCULAR; INTRAVENOUS at 01:49

## 2024-05-21 RX ADMIN — ACETAMINOPHEN 975 MG: 325 TABLET, FILM COATED ORAL at 01:49

## 2024-05-21 NOTE — DISCHARGE INSTRUCTIONS
As discussed, in order to prevent infection, take Augmentin twice a day for the next 10 days, finish entire duration even if feeling better.  Keep areas of dog bites clean, dry, wash with warm soap and water daily.  Put bacitracin over the cuts twice a day.  Cover with a Band-Aid.  For any pain, can take Tylenol or Motrin, follow directions on the bottle.  Return to ED if any fevers, chills, difficulty moving your hands, rash around site of dog bites, pus draining from dog bites, swelling, pain that is not controlled with Tylenol or Motrin.

## 2024-05-21 NOTE — ED ATTENDING ATTESTATION
5/21/2024  I, Luis Alfredo Chao MD, saw and evaluated the patient. I have discussed the patient with the resident/non-physician practitioner and agree with the resident's/non-physician practitioner's findings, Plan of Care, and MDM as documented in the resident's/non-physician practitioner's note, except where noted. All available labs and Radiology studies were reviewed.  I was present for key portions of any procedure(s) performed by the resident/non-physician practitioner and I was immediately available to provide assistance.       At this point I agree with the current assessment done in the Emergency Department.  I have conducted an independent evaluation of this patient a history and physical is as follows: Patient is a 23 year old male who was bit by his own dog tonight when he was trying to get trash from the dog. (+) bilateral hand bites and R ankle bites. Pain at bite sites. Dog's rabies vaccine UTD. Last Tdap was in 2018 as per Epic. RHD. Was last seen at [facility] [place] ED on 4/12/22 for viral syndrome. [facility] website checked on this patient and no Rx found. (+) bilateral hand bite wounds and no suturable wounds noted with tenderness. No active bleeding. (+) some swelling  noted. NVI. (+) R anterior and posterior ankle bite wounds noted as well with no suturable wound. NVI. No significant swelling of R ankle. DDx including but not limited to:  Bite wound, no suturable laceration, abrasion, puncture wound; doubt cellulitis, wound infection, abscess, rabies prophylaxis; tetanus prophylaxis; doubt osteomyelitis or necrotizing fasciitis. Will check x-rays and give Tdap and augmentin and pain medication.     ED Course         Critical Care Time  Procedures

## 2024-05-21 NOTE — Clinical Note
Antonio Richmond was seen and treated in our emergency department on 5/21/2024.                Diagnosis:     Antonio  .    He may return on this date: 05/23/2024    Can return sooner if feeling okay     If you have any questions or concerns, please don't hesitate to call.      Trinity Gifford MD    ______________________________           _______________          _______________  Hospital Representative                              Date                                Time

## 2024-05-21 NOTE — ED PROVIDER NOTES
History  Chief Complaint   Patient presents with    Dog Bite     Pt got bit by pt's dog about 20 minute's ago when pt was trying to get a piece of trash from dog. Dog is UTD on vaccines     23-year-old male presented ED for evaluation of multiple dog bites.  Patient reports his dog bit him about 20 minutes prior to ED arrival on his right and left hands, right lower extremity.  Patient reports his dog was in a cage, he accidentally dropped something on top of the cage and thinks he scared the dog which caused the dog to bite him.  Dog is vaccinated.  Last Tdap in 2018.  No antibiotic allergies.  No paresthesias, no weakness. No fevers, no chills.  Denies chest pain, shortness of breath, ab pain, urinary symptoms, URI symptoms.        None       Past Medical History:   Diagnosis Date    No known health problems     Suicide attempt (HCC) 2020       Past Surgical History:   Procedure Laterality Date    NO PAST SURGERIES         Family History   Problem Relation Age of Onset    No Known Problems Mother     No Known Problems Father      I have reviewed and agree with the history as documented.    E-Cigarette/Vaping    E-Cigarette Use Current Every Day User      E-Cigarette/Vaping Substances     Social History     Tobacco Use    Smoking status: Every Day     Types: E-Cigarettes    Smokeless tobacco: Never   Vaping Use    Vaping status: Every Day   Substance Use Topics    Alcohol use: Not Currently    Drug use: Never        Review of Systems   Constitutional:  Negative for chills and fever.   HENT:  Negative for ear pain and sore throat.    Eyes:  Negative for pain and visual disturbance.   Respiratory:  Negative for cough and shortness of breath.    Cardiovascular:  Negative for chest pain and palpitations.   Gastrointestinal:  Negative for abdominal pain and vomiting.   Genitourinary:  Negative for dysuria and hematuria.   Musculoskeletal:  Negative for arthralgias and back pain.        Dog bites   Skin:  Negative for  color change and rash.   Neurological:  Negative for seizures and syncope.   All other systems reviewed and are negative.      Physical Exam  ED Triage Vitals [05/21/24 0112]   Temperature Pulse Respirations Blood Pressure SpO2   98.6 °F (37 °C) 102 20 154/73 99 %      Temp Source Heart Rate Source Patient Position - Orthostatic VS BP Location FiO2 (%)   Oral Monitor Sitting Right arm --      Pain Score       --             Orthostatic Vital Signs  Vitals:    05/21/24 0112   BP: 154/73   Pulse: 102   Patient Position - Orthostatic VS: Sitting       Physical Exam  Vitals and nursing note reviewed.   Constitutional:       General: He is not in acute distress.     Appearance: He is well-developed.   HENT:      Head: Normocephalic and atraumatic.   Eyes:      Conjunctiva/sclera: Conjunctivae normal.   Cardiovascular:      Rate and Rhythm: Normal rate and regular rhythm.      Pulses: Normal pulses.           Radial pulses are 2+ on the right side and 2+ on the left side.        Dorsalis pedis pulses are 2+ on the right side and 2+ on the left side.      Heart sounds: Normal heart sounds, S1 normal and S2 normal. No murmur heard.  Pulmonary:      Effort: Pulmonary effort is normal. No respiratory distress.      Breath sounds: Normal breath sounds.   Abdominal:      Palpations: Abdomen is soft.      Tenderness: There is no abdominal tenderness.   Musculoskeletal:         General: No swelling.      Cervical back: Neck supple.      Right lower leg: No edema.      Left lower leg: No edema.      Comments: Puncture wound and swelling on dorsal aspect of right fourth finger.   Puncture wound on ventral aspect of left MCP  Puncture wound on distal end of right tibia and right heel.    Bilateral hands with normal abduction and adduction of all 5 fingers, can make a fist, normal okay sign, neurovascularly intact.  No signs of tenosynovitis.  Radial pulses 2+ bilaterally.   Skin:     General: Skin is warm and dry.      Capillary  Refill: Capillary refill takes less than 2 seconds.   Neurological:      Mental Status: He is alert.   Psychiatric:         Mood and Affect: Mood normal.         ED Medications  Medications   tetanus-diphtheria-acellular pertussis (BOOSTRIX) IM injection 0.5 mL (0.5 mL Intramuscular Given 5/21/24 0149)   amoxicillin-clavulanate (AUGMENTIN) 875-125 mg per tablet 1 tablet (1 tablet Oral Given 5/21/24 0149)   acetaminophen (TYLENOL) tablet 975 mg (975 mg Oral Given 5/21/24 0149)   ketorolac (TORADOL) injection 30 mg (30 mg Intramuscular Given 5/21/24 0149)       Diagnostic Studies  Results Reviewed       None                   XR hand 3+ views RIGHT   ED Interpretation by Trinity Gifford MD (05/21 0141)   No foreign body, no fracture or dislocation      XR hand 3+ views LEFT   ED Interpretation by Trinity Gifford MD (05/21 0140)   No foreign body, no fracture or dislocation      XR tibia fibula 2 views RIGHT   ED Interpretation by Trinity Gifford MD (05/21 0141)   No foreign body, no fracture or dislocation            Procedures  Procedures      ED Course  ED Course as of 05/21/24 0601   Tue May 21, 2024   0140 XR hand 3+ views LEFT  No foreign body, no fracture or dislocation   0141 XR hand 3+ views RIGHT  No foreign body, no fracture or dislocation   0141 XR tibia fibula 2 views RIGHT  No foreign body, no fracture or dislocation   0157 All wounds were wrapped in Xeroform followed by gauze.  Patient given extra Xeroform and gauze for home.                                       Medical Decision Making  23-year-old male presented ED for evaluation of dog bites which occurred 20 minutes prior to arrival.  Patient's dog bit him after patient accidentally scared dog, dog is vaccinated.    No wounds requiring suture repair.        Will obtain XR right and left hand, XR right tib-fib to rule out foreign bodies, give Tylenol, Toradol, Augmentin, update Tdap.    No need for rabies vaccine as dog is vaccinated.    Imaging  negative for foreign bodies, fractures or dislocations.    All wounds were wrapped with Xeroform followed by gauze.  Patient was also given some Xeroform and gauze to bring home.    Patient was discharged home with prescription for Augmentin, bacitracin, strict return precautions.  Discussed appropriate wound care with patient.  Verbalized understanding.    Amount and/or Complexity of Data Reviewed  Radiology: ordered and independent interpretation performed. Decision-making details documented in ED Course.    Risk  OTC drugs.  Prescription drug management.          Disposition  Final diagnoses:   Dog bite, initial encounter     Time reflects when diagnosis was documented in both MDM as applicable and the Disposition within this note       Time User Action Codes Description Comment    5/21/2024  1:29 AM Trinity Gifford Add [W54.0XXA] Dog bite, initial encounter           ED Disposition       ED Disposition   Discharge    Condition   Stable    Date/Time   Tue May 21, 2024  1:42 AM    Comment   Antonio Richmond discharge to home/self care.                   Follow-up Information    None         Discharge Medication List as of 5/21/2024  1:43 AM        START taking these medications    Details   amoxicillin-clavulanate (AUGMENTIN) 875-125 mg per tablet Take 1 tablet by mouth every 12 (twelve) hours for 10 days, Starting Tue 5/21/2024, Until Fri 5/31/2024, Normal      bacitracin topical ointment 500 units/g topical ointment Apply 1 large application topically 2 (two) times a day, Starting Tue 5/21/2024, Normal           No discharge procedures on file.    PDMP Review         Value Time User    PDMP Reviewed  Yes 5/21/2024  1:16 AM Luis Alfredo Chao MD             ED Provider  Attending physically available and evaluated Antonio Richmond. I managed the patient along with the ED Attending.    Electronically Signed by           Trinity Gifford MD  05/21/24 0601